# Patient Record
Sex: FEMALE | Race: WHITE | NOT HISPANIC OR LATINO | Employment: OTHER | URBAN - METROPOLITAN AREA
[De-identification: names, ages, dates, MRNs, and addresses within clinical notes are randomized per-mention and may not be internally consistent; named-entity substitution may affect disease eponyms.]

---

## 2017-01-10 ENCOUNTER — APPOINTMENT (OUTPATIENT)
Dept: RADIOLOGY | Facility: MEDICAL CENTER | Age: 72
End: 2017-01-10
Attending: INTERNAL MEDICINE
Payer: MEDICARE

## 2017-01-10 ENCOUNTER — RESOLUTE PROFESSIONAL BILLING HOSPITAL PROF FEE (OUTPATIENT)
Dept: CARDIOLOGY | Facility: MEDICAL CENTER | Age: 72
End: 2017-01-10
Payer: MEDICARE

## 2017-01-10 ENCOUNTER — APPOINTMENT (OUTPATIENT)
Dept: RADIOLOGY | Facility: MEDICAL CENTER | Age: 72
End: 2017-01-10
Attending: EMERGENCY MEDICINE
Payer: MEDICARE

## 2017-01-10 ENCOUNTER — HOSPITAL ENCOUNTER (OUTPATIENT)
Facility: MEDICAL CENTER | Age: 72
End: 2017-01-11
Attending: EMERGENCY MEDICINE | Admitting: INTERNAL MEDICINE
Payer: MEDICARE

## 2017-01-10 DIAGNOSIS — R94.31 ABNORMAL EKG: ICD-10-CM

## 2017-01-10 LAB
ALBUMIN SERPL BCP-MCNC: 3.8 G/DL (ref 3.2–4.9)
ALBUMIN/GLOB SERPL: 1.4 G/DL
ALP SERPL-CCNC: 67 U/L (ref 30–99)
ALT SERPL-CCNC: 13 U/L (ref 2–50)
ANION GAP SERPL CALC-SCNC: 11 MMOL/L (ref 0–11.9)
APTT PPP: 31.2 SEC (ref 24.7–36)
AST SERPL-CCNC: 24 U/L (ref 12–45)
BASOPHILS # BLD AUTO: 0.7 % (ref 0–1.8)
BASOPHILS # BLD: 0.04 K/UL (ref 0–0.12)
BILIRUB SERPL-MCNC: 0.2 MG/DL (ref 0.1–1.5)
BNP SERPL-MCNC: 95 PG/ML (ref 0–100)
BUN SERPL-MCNC: 8 MG/DL (ref 8–22)
CALCIUM SERPL-MCNC: 9 MG/DL (ref 8.5–10.5)
CHLORIDE SERPL-SCNC: 103 MMOL/L (ref 96–112)
CO2 SERPL-SCNC: 26 MMOL/L (ref 20–33)
CREAT SERPL-MCNC: 0.57 MG/DL (ref 0.5–1.4)
EKG IMPRESSION: NORMAL
EKG IMPRESSION: NORMAL
EOSINOPHIL # BLD AUTO: 0.14 K/UL (ref 0–0.51)
EOSINOPHIL NFR BLD: 2.3 % (ref 0–6.9)
ERYTHROCYTE [DISTWIDTH] IN BLOOD BY AUTOMATED COUNT: 47.9 FL (ref 35.9–50)
GFR SERPL CREATININE-BSD FRML MDRD: >60 ML/MIN/1.73 M 2
GLOBULIN SER CALC-MCNC: 2.8 G/DL (ref 1.9–3.5)
GLUCOSE SERPL-MCNC: 127 MG/DL (ref 65–99)
HCT VFR BLD AUTO: 39.4 % (ref 37–47)
HGB BLD-MCNC: 12.8 G/DL (ref 12–16)
IMM GRANULOCYTES # BLD AUTO: 0.03 K/UL (ref 0–0.11)
IMM GRANULOCYTES NFR BLD AUTO: 0.5 % (ref 0–0.9)
INR PPP: 0.96 (ref 0.87–1.13)
LV EJECT FRACT  99904: 65
LV EJECT FRACT MOD 2C 99903: 67.25
LV EJECT FRACT MOD 4C 99902: 66.88
LV EJECT FRACT MOD BP 99901: 67.95
LYMPHOCYTES # BLD AUTO: 0.79 K/UL (ref 1–4.8)
LYMPHOCYTES NFR BLD: 13 % (ref 22–41)
MAGNESIUM SERPL-MCNC: 1.3 MG/DL (ref 1.5–2.5)
MAGNESIUM SERPL-MCNC: 2 MG/DL (ref 1.5–2.5)
MCH RBC QN AUTO: 31.1 PG (ref 27–33)
MCHC RBC AUTO-ENTMCNC: 32.5 G/DL (ref 33.6–35)
MCV RBC AUTO: 95.6 FL (ref 81.4–97.8)
MONOCYTES # BLD AUTO: 0.38 K/UL (ref 0–0.85)
MONOCYTES NFR BLD AUTO: 6.3 % (ref 0–13.4)
NEUTROPHILS # BLD AUTO: 4.69 K/UL (ref 2–7.15)
NEUTROPHILS NFR BLD: 77.2 % (ref 44–72)
NRBC # BLD AUTO: 0 K/UL
NRBC BLD AUTO-RTO: 0 /100 WBC
PHOSPHATE SERPL-MCNC: 2.3 MG/DL (ref 2.5–4.5)
PHOSPHATE SERPL-MCNC: 3.5 MG/DL (ref 2.5–4.5)
PLATELET # BLD AUTO: 298 K/UL (ref 164–446)
PMV BLD AUTO: 9.7 FL (ref 9–12.9)
POTASSIUM SERPL-SCNC: 3.6 MMOL/L (ref 3.6–5.5)
PROT SERPL-MCNC: 6.6 G/DL (ref 6–8.2)
PROTHROMBIN TIME: 13.1 SEC (ref 12–14.6)
RBC # BLD AUTO: 4.12 M/UL (ref 4.2–5.4)
SODIUM SERPL-SCNC: 140 MMOL/L (ref 135–145)
TROPONIN I SERPL-MCNC: <0.01 NG/ML (ref 0–0.04)
WBC # BLD AUTO: 6.1 K/UL (ref 4.8–10.8)

## 2017-01-10 PROCEDURE — A9270 NON-COVERED ITEM OR SERVICE: HCPCS | Performed by: INTERNAL MEDICINE

## 2017-01-10 PROCEDURE — 93005 ELECTROCARDIOGRAM TRACING: CPT | Performed by: EMERGENCY MEDICINE

## 2017-01-10 PROCEDURE — 700102 HCHG RX REV CODE 250 W/ 637 OVERRIDE(OP): Performed by: INTERNAL MEDICINE

## 2017-01-10 PROCEDURE — 85730 THROMBOPLASTIN TIME PARTIAL: CPT

## 2017-01-10 PROCEDURE — 84100 ASSAY OF PHOSPHORUS: CPT

## 2017-01-10 PROCEDURE — 99205 OFFICE O/P NEW HI 60 MIN: CPT | Mod: 25 | Performed by: INTERNAL MEDICINE

## 2017-01-10 PROCEDURE — 96366 THER/PROPH/DIAG IV INF ADDON: CPT

## 2017-01-10 PROCEDURE — 93010 ELECTROCARDIOGRAM REPORT: CPT | Performed by: INTERNAL MEDICINE

## 2017-01-10 PROCEDURE — 93306 TTE W/DOPPLER COMPLETE: CPT

## 2017-01-10 PROCEDURE — 36415 COLL VENOUS BLD VENIPUNCTURE: CPT

## 2017-01-10 PROCEDURE — 80053 COMPREHEN METABOLIC PANEL: CPT

## 2017-01-10 PROCEDURE — 700101 HCHG RX REV CODE 250: Performed by: OPHTHALMOLOGY

## 2017-01-10 PROCEDURE — 96375 TX/PRO/DX INJ NEW DRUG ADDON: CPT

## 2017-01-10 PROCEDURE — 94760 N-INVAS EAR/PLS OXIMETRY 1: CPT

## 2017-01-10 PROCEDURE — 96361 HYDRATE IV INFUSION ADD-ON: CPT

## 2017-01-10 PROCEDURE — 93306 TTE W/DOPPLER COMPLETE: CPT | Mod: 26 | Performed by: INTERNAL MEDICINE

## 2017-01-10 PROCEDURE — 700105 HCHG RX REV CODE 258: Performed by: INTERNAL MEDICINE

## 2017-01-10 PROCEDURE — 85025 COMPLETE CBC W/AUTO DIFF WBC: CPT

## 2017-01-10 PROCEDURE — 83880 ASSAY OF NATRIURETIC PEPTIDE: CPT

## 2017-01-10 PROCEDURE — 93005 ELECTROCARDIOGRAM TRACING: CPT | Performed by: INTERNAL MEDICINE

## 2017-01-10 PROCEDURE — 70450 CT HEAD/BRAIN W/O DYE: CPT

## 2017-01-10 PROCEDURE — 96365 THER/PROPH/DIAG IV INF INIT: CPT

## 2017-01-10 PROCEDURE — G0378 HOSPITAL OBSERVATION PER HR: HCPCS

## 2017-01-10 PROCEDURE — 99220 PR INITIAL OBSERVATION CARE,LEVL III: CPT | Performed by: INTERNAL MEDICINE

## 2017-01-10 PROCEDURE — 99285 EMERGENCY DEPT VISIT HI MDM: CPT

## 2017-01-10 PROCEDURE — 700101 HCHG RX REV CODE 250: Performed by: INTERNAL MEDICINE

## 2017-01-10 PROCEDURE — 71010 DX-CHEST-PORTABLE (1 VIEW): CPT

## 2017-01-10 PROCEDURE — 700111 HCHG RX REV CODE 636 W/ 250 OVERRIDE (IP): Performed by: INTERNAL MEDICINE

## 2017-01-10 PROCEDURE — 85610 PROTHROMBIN TIME: CPT

## 2017-01-10 PROCEDURE — 84484 ASSAY OF TROPONIN QUANT: CPT

## 2017-01-10 PROCEDURE — 83735 ASSAY OF MAGNESIUM: CPT

## 2017-01-10 RX ORDER — LACTULOSE 20 G/30ML
30 SOLUTION ORAL
Status: DISCONTINUED | OUTPATIENT
Start: 2017-01-11 | End: 2017-01-11 | Stop reason: HOSPADM

## 2017-01-10 RX ORDER — ASPIRIN 325 MG
325 TABLET ORAL ONCE
Status: DISPENSED | OUTPATIENT
Start: 2017-01-10 | End: 2017-01-11

## 2017-01-10 RX ORDER — ONDANSETRON 4 MG/1
4 TABLET, ORALLY DISINTEGRATING ORAL EVERY 4 HOURS PRN
Status: DISCONTINUED | OUTPATIENT
Start: 2017-01-10 | End: 2017-01-10

## 2017-01-10 RX ORDER — ERYTHROMYCIN 5 MG/G
OINTMENT OPHTHALMIC ONCE
Status: DISCONTINUED | OUTPATIENT
Start: 2017-01-10 | End: 2017-01-11 | Stop reason: HOSPADM

## 2017-01-10 RX ORDER — MAGNESIUM SULFATE HEPTAHYDRATE 40 MG/ML
4 INJECTION, SOLUTION INTRAVENOUS ONCE
Status: COMPLETED | OUTPATIENT
Start: 2017-01-10 | End: 2017-01-11

## 2017-01-10 RX ORDER — M-VIT,TX,IRON,MINS/CALC/FOLIC 27MG-0.4MG
1 TABLET ORAL DAILY
COMMUNITY

## 2017-01-10 RX ORDER — ATORVASTATIN CALCIUM 80 MG/1
80 TABLET, FILM COATED ORAL
Status: DISCONTINUED | OUTPATIENT
Start: 2017-01-10 | End: 2017-01-10

## 2017-01-10 RX ORDER — SODIUM CHLORIDE 9 MG/ML
INJECTION, SOLUTION INTRAVENOUS CONTINUOUS
Status: DISCONTINUED | OUTPATIENT
Start: 2017-01-10 | End: 2017-01-11 | Stop reason: HOSPADM

## 2017-01-10 RX ORDER — AMOXICILLIN 250 MG
1 CAPSULE ORAL NIGHTLY
Status: DISCONTINUED | OUTPATIENT
Start: 2017-01-11 | End: 2017-01-11 | Stop reason: HOSPADM

## 2017-01-10 RX ORDER — BISACODYL 10 MG
10 SUPPOSITORY, RECTAL RECTAL
Status: DISCONTINUED | OUTPATIENT
Start: 2017-01-11 | End: 2017-01-11 | Stop reason: HOSPADM

## 2017-01-10 RX ORDER — VENLAFAXINE HYDROCHLORIDE 75 MG/1
150 CAPSULE, EXTENDED RELEASE ORAL DAILY
COMMUNITY

## 2017-01-10 RX ORDER — VENLAFAXINE HYDROCHLORIDE 75 MG/1
150 CAPSULE, EXTENDED RELEASE ORAL DAILY
Status: DISCONTINUED | OUTPATIENT
Start: 2017-01-11 | End: 2017-01-11 | Stop reason: HOSPADM

## 2017-01-10 RX ORDER — DOCUSATE SODIUM 100 MG/1
100 CAPSULE, LIQUID FILLED ORAL EVERY MORNING
Status: DISCONTINUED | OUTPATIENT
Start: 2017-01-11 | End: 2017-01-11 | Stop reason: HOSPADM

## 2017-01-10 RX ORDER — ENEMA 19; 7 G/133ML; G/133ML
1 ENEMA RECTAL
Status: DISCONTINUED | OUTPATIENT
Start: 2017-01-11 | End: 2017-01-11 | Stop reason: HOSPADM

## 2017-01-10 RX ORDER — MORPHINE SULFATE 4 MG/ML
2-4 INJECTION, SOLUTION INTRAMUSCULAR; INTRAVENOUS
Status: ACTIVE | OUTPATIENT
Start: 2017-01-10 | End: 2017-01-11

## 2017-01-10 RX ORDER — M-VIT,TX,IRON,MINS/CALC/FOLIC 27MG-0.4MG
1 TABLET ORAL DAILY
Status: DISCONTINUED | OUTPATIENT
Start: 2017-01-11 | End: 2017-01-11 | Stop reason: HOSPADM

## 2017-01-10 RX ORDER — LABETALOL HYDROCHLORIDE 5 MG/ML
10 INJECTION, SOLUTION INTRAVENOUS EVERY 4 HOURS PRN
Status: DISCONTINUED | OUTPATIENT
Start: 2017-01-10 | End: 2017-01-11 | Stop reason: HOSPADM

## 2017-01-10 RX ORDER — ROSUVASTATIN CALCIUM 20 MG/1
20 TABLET, COATED ORAL EVERY EVENING
Status: DISCONTINUED | OUTPATIENT
Start: 2017-01-10 | End: 2017-01-11 | Stop reason: HOSPADM

## 2017-01-10 RX ORDER — OLMESARTAN MEDOXOMIL 20 MG/1
40 TABLET ORAL
Status: DISCONTINUED | OUTPATIENT
Start: 2017-01-10 | End: 2017-01-11 | Stop reason: HOSPADM

## 2017-01-10 RX ORDER — VENLAFAXINE HYDROCHLORIDE 75 MG/1
75 CAPSULE, EXTENDED RELEASE ORAL DAILY
Status: DISCONTINUED | OUTPATIENT
Start: 2017-01-10 | End: 2017-01-10

## 2017-01-10 RX ORDER — ALBUTEROL SULFATE 90 UG/1
2 AEROSOL, METERED RESPIRATORY (INHALATION) EVERY 6 HOURS PRN
Status: DISCONTINUED | OUTPATIENT
Start: 2017-01-10 | End: 2017-01-11 | Stop reason: HOSPADM

## 2017-01-10 RX ORDER — VENLAFAXINE 75 MG/1
150 TABLET ORAL EVERY MORNING
Status: DISCONTINUED | OUTPATIENT
Start: 2017-01-11 | End: 2017-01-10

## 2017-01-10 RX ORDER — LANOLIN ALCOHOL/MO/W.PET/CERES
3 CREAM (GRAM) TOPICAL
COMMUNITY

## 2017-01-10 RX ORDER — ACETAMINOPHEN 325 MG/1
650 TABLET ORAL EVERY 6 HOURS PRN
Status: DISCONTINUED | OUTPATIENT
Start: 2017-01-10 | End: 2017-01-11 | Stop reason: HOSPADM

## 2017-01-10 RX ORDER — AZITHROMYCIN 250 MG/1
250-500 TABLET, FILM COATED ORAL DAILY
Status: ON HOLD | COMMUNITY
Start: 2017-01-06 | End: 2017-01-11

## 2017-01-10 RX ORDER — NITROGLYCERIN 0.4 MG/1
0.4 TABLET SUBLINGUAL
Status: DISCONTINUED | OUTPATIENT
Start: 2017-01-10 | End: 2017-01-11 | Stop reason: HOSPADM

## 2017-01-10 RX ORDER — POTASSIUM CHLORIDE 20 MEQ/1
40 TABLET, EXTENDED RELEASE ORAL EVERY 4 HOURS
Status: COMPLETED | OUTPATIENT
Start: 2017-01-10 | End: 2017-01-10

## 2017-01-10 RX ORDER — ROSUVASTATIN CALCIUM 20 MG/1
20 TABLET, COATED ORAL EVERY EVENING
COMMUNITY

## 2017-01-10 RX ORDER — ALBUTEROL SULFATE 90 UG/1
2 AEROSOL, METERED RESPIRATORY (INHALATION) EVERY 6 HOURS PRN
COMMUNITY

## 2017-01-10 RX ORDER — ERYTHROMYCIN 5 MG/G
OINTMENT OPHTHALMIC 4 TIMES DAILY
Status: DISCONTINUED | OUTPATIENT
Start: 2017-01-10 | End: 2017-01-11 | Stop reason: HOSPADM

## 2017-01-10 RX ORDER — AMOXICILLIN 250 MG
1 CAPSULE ORAL
Status: DISCONTINUED | OUTPATIENT
Start: 2017-01-11 | End: 2017-01-11 | Stop reason: HOSPADM

## 2017-01-10 RX ORDER — ONDANSETRON 2 MG/ML
4 INJECTION INTRAMUSCULAR; INTRAVENOUS EVERY 4 HOURS PRN
Status: DISCONTINUED | OUTPATIENT
Start: 2017-01-10 | End: 2017-01-10

## 2017-01-10 RX ADMIN — ACETAMINOPHEN 650 MG: 325 TABLET, FILM COATED ORAL at 15:00

## 2017-01-10 RX ADMIN — LABETALOL HYDROCHLORIDE 10 MG: 5 INJECTION, SOLUTION INTRAVENOUS at 17:06

## 2017-01-10 RX ADMIN — SODIUM CHLORIDE: 9 INJECTION, SOLUTION INTRAVENOUS at 14:16

## 2017-01-10 RX ADMIN — ERYTHROMYCIN: 5 OINTMENT OPHTHALMIC at 20:34

## 2017-01-10 RX ADMIN — POTASSIUM CHLORIDE 40 MEQ: 1500 TABLET, EXTENDED RELEASE ORAL at 14:16

## 2017-01-10 RX ADMIN — ROSUVASTATIN CALCIUM 20 MG: 20 TABLET ORAL at 20:34

## 2017-01-10 RX ADMIN — POTASSIUM CHLORIDE 40 MEQ: 1500 TABLET, EXTENDED RELEASE ORAL at 18:44

## 2017-01-10 RX ADMIN — MAGNESIUM SULFATE IN WATER 4 G: 40 INJECTION, SOLUTION INTRAVENOUS at 20:34

## 2017-01-10 RX ADMIN — METOPROLOL TARTRATE 25 MG: 25 TABLET, FILM COATED ORAL at 14:16

## 2017-01-10 RX ADMIN — ACETAMINOPHEN 650 MG: 325 TABLET, FILM COATED ORAL at 21:37

## 2017-01-10 ASSESSMENT — PAIN SCALES - GENERAL
PAINLEVEL_OUTOF10: 0
PAINLEVEL_OUTOF10: 2
PAINLEVEL_OUTOF10: 0

## 2017-01-10 ASSESSMENT — COPD QUESTIONNAIRES
HAVE YOU SMOKED AT LEAST 100 CIGARETTES IN YOUR ENTIRE LIFE: YES
DO YOU EVER COUGH UP ANY MUCUS OR PHLEGM?: NO/ONLY WITH OCCASIONAL COLDS OR INFECTIONS
DURING THE PAST 4 WEEKS HOW MUCH DID YOU FEEL SHORT OF BREATH: SOME OF THE TIME
COPD SCREENING SCORE: 6

## 2017-01-10 ASSESSMENT — LIFESTYLE VARIABLES
ON A TYPICAL DAY WHEN YOU DRINK ALCOHOL HOW MANY DRINKS DO YOU HAVE: 1
EVER_SMOKED: NEVER
EVER FELT BAD OR GUILTY ABOUT YOUR DRINKING: NO
EVER_SMOKED: YES
TOTAL SCORE: 0
HAVE PEOPLE ANNOYED YOU BY CRITICIZING YOUR DRINKING: NO
TOTAL SCORE: 0
HAVE YOU EVER FELT YOU SHOULD CUT DOWN ON YOUR DRINKING: NO
HOW MANY TIMES IN THE PAST YEAR HAVE YOU HAD 5 OR MORE DRINKS IN A DAY: 0
DO YOU DRINK ALCOHOL: YES
EVER HAD A DRINK FIRST THING IN THE MORNING TO STEADY YOUR NERVES TO GET RID OF A HANGOVER: NO
AVERAGE NUMBER OF DAYS PER WEEK YOU HAVE A DRINK CONTAINING ALCOHOL: 3
TOTAL SCORE: 0
CONSUMPTION TOTAL: NEGATIVE

## 2017-01-10 NOTE — ED NOTES
Med rec updated and and complete  Allergies reviewed  Pt had 3 RX bottles at bedside, went over RX bottles and returned RX bottles back to daughter.

## 2017-01-10 NOTE — ED PROVIDER NOTES
ED Provider Note    Scribed for Dashawn Landon M.D. by Dolores Ruiz. 1/10/2017  8:36 AM    Primary care provider: Jamie YANG M.D.  Means of arrival: Ambulance  History obtained from: Patient  History limited by: None    CHIEF COMPLAINT  Chief Complaint   Patient presents with   • Other     pt was brought in becuase of abnormal ekg at [facility], was at follow up for suture on left eye.        HPI  Gely Watson is a 71 y.o. female who presents to the Emergency Department due to an abnormal EKG reading at the [facility]. She states that she last had an EKG while at [facility] a year ago by her cardiologist, Dr. Souza. Patient reports that she just recently got over bronchitis and finished her antibiotics 5 days ago. Denies chest pain, shortness of breath.    REVIEW OF SYSTEMS  Pertinent positives include abnormal EKG reading. Pertinent negatives include no chest pain, SOB.  All other systems reviewed and negative.    PAST MEDICAL HISTORY   has a past medical history of Hypertension and Psychiatric disorder.    SURGICAL HISTORY   has past surgical history that includes other.    SOCIAL HISTORY  Social History   Substance Use Topics   • Smoking status: Former Smoker     Quit date: 12/05/1978   • Smokeless tobacco: None   • Alcohol Use: Yes      Comment: glass of wine      History   Drug Use No       FAMILY HISTORY  History reviewed. No pertinent family history.    CURRENT MEDICATIONS  Home Medications     Reviewed by Zay Bhatt (Pharmacy Tech) on 01/10/17 at 1213  Med List Status: Complete    Medication Last Dose Status    albuterol 108 (90 BASE) MCG/ACT Aero Soln inhalation aerosol 1/10/2017 Active    azithromycin (ZITHROMAX) 250 MG Tab 1/9/2017 Active    Coenzyme Q10 (CO Q 10 PO) > 2 weeks Active    Cyanocobalamin (B-12 PO) > 2 weeks Active    KRILL OIL PO > 2 weeks Active    melatonin 3 MG Tab 1/9/2017 Active    olmesartan (BENICAR) 40 MG TABS 1/9/2017  "Active    rosuvastatin (CRESTOR) 20 MG Tab 1/9/2017 Active    therapeutic multivitamin-minerals (THERAGRAN-M) Tab > 2 weeks Active    venlafaxine XR (EFFEXOR XR) 75 MG CAPSULE SR 24 HR 1/10/2017 Active                ALLERGIES  No Known Allergies    PHYSICAL EXAM  VITAL SIGNS: /83 mmHg  Pulse 60  Temp(Src) 35.9 °C (96.6 °F)  Resp 16  Ht 1.575 m (5' 2.01\")  Wt 54.885 kg (121 lb)  BMI 22.13 kg/m2    Constitutional: Well developed, Well nourished, no distress, Non-toxic appearance.   HENT: Normocephalic, Atraumatic, Bilateral external ears normal, Oropharynx moist, No oral exudates.   Eyes: Patch over left eye. PERRLA, EOMI, Conjunctiva normal, No discharge.   Neck: No tenderness, Supple, No stridor.   Lymphatic: No lymphadenopathy noted.   Cardiovascular: Normal heart rate, Normal rhythm.   Thorax & Lungs: Clear to auscultation bilaterally, No respiratory distress, No wheezing, Bilateral crackles over bases  Abdomen: Soft, No tenderness, No masses, No pulsatile masses.   Skin: Warm, Dry, No erythema, No rash.   Extremities:, No edema No cyanosis.   Musculoskeletal: No tenderness to palpation or major deformities noted.  Intact distal pulses  Neurologic: Awake, alert. Moves all extremities spontaneously.  Psychiatric: Slightly anxious. Affect normal, Judgment normal, Mood normal.      LABS  Labs Reviewed   CBC WITH DIFFERENTIAL - Abnormal; Notable for the following:     RBC 4.12 (*)     MCHC 32.5 (*)     Neutrophils-Polys 77.20 (*)     Lymphocytes 13.00 (*)     Lymphs (Absolute) 0.79 (*)     All other components within normal limits   COMP METABOLIC PANEL - Abnormal; Notable for the following:     Glucose 127 (*)     All other components within normal limits   MAGNESIUM - Abnormal; Notable for the following:     Magnesium 1.3 (*)     All other components within normal limits   PHOSPHORUS - Abnormal; Notable for the following:     Phosphorus 2.3 (*)     All other components within normal limits   TROPONIN "   BTYPE NATRIURETIC PEPTIDE   ESTIMATED GFR   TROPONIN   PROTHROMBIN TIME    Narrative:     Indicate which anticoagulants the patient is on:->NONE   APTT    Narrative:     Indicate which anticoagulants the patient is on:->NONE   MAGNESIUM   PHOSPHORUS   TROPONIN     All labs reviewed by me.    EKG  12 lead EKG interpreted by me to show normal sinus rhythm at a rate of 60 . Normal P waves, Normal QRS, Normal ST-T waves,  Normal axis. Normal intervals.Overall clinical impression: flipped T waves concerning for ischemia. No old EKG in the system however grossly unchanged from earlier today. However her reviewed from EKG in April 2014 shows new flipped T waves concerning for ischemia.    RADIOLOGY  CT-HEAD W/O   Final Result      Head CT without contrast within normal limits. No evidence of acute cerebral infarction, hemorrhage or mass lesion.      ECHOCARDIOGRAM COMP W/O CONT   Final Result      DX-CHEST-PORTABLE (1 VIEW)   Final Result      1.  Basilar densities which are most likely atelectasis      2.  Enlarged cardiac silhouette      NM-CARDIAC STRESS TEST    (Results Pending)     The radiologist's interpretation of all radiological studies have been reviewed by me.    COURSE & MEDICAL DECISION MAKING  Pertinent Labs & Imaging studies reviewed. (See chart for details)    9:01 AM - I reviewed the patient's medical records which showed a history of hypertension, chronic PVCs. Reportedly EKG showed questionable wide spread ischemia vs. STEMI. Was given Morphine verse-ed and triol . Was pre-opted to get a closer of her left eyelid. Had blood tests on the 6th with a WBC 11.6, sugar 157. EKG at outside facility shows diffuse flip T waves laterally and septally.  EKG here shows continue flip T waves. 1 AVL V2-V6. No ST elevation. Overall flip T wave concerning for ischemia. EKG from 2014 was fairly normal. A nuclear stress test was done in May 2014 that was normal.     9:01 AM - Patient seen and examined at bedside.  Ordered DX-chest, CBC with differential, CMP, troponin, BNP,, EKG to evaluate her symptoms.     9:40 AM - Discussed old EKG results with the patient which was relatively normal. I addressed any further questions and concerns and [facility]     11:07 AM - I reviewed the patient's above findings and am paging the hospitalist for admission.    11:07 AM - I discussed the patient's case and the above findings with the Hospitalist who agrees to consult. He is requesting to speak with the patients cardiologist, Dr. Harley lynch.       Decision Making:  Patient was grossly abnormal EKG no overt symptoms. Reviewed old records was able to get old records from [facility] that shows a fairly normal EKG in April 2014. Due to this I believe the patient should be admitted to the hospital for cardiac workup, differential diagnosis includes silent myocardial infarction, stress cardiomyopathy, discussed the case with the hospitalist for admission to hospital.    DISPOSITION:  Patient will be admitted to hospitalist in guarded condition.      FINAL IMPRESSION  1. Abnormal EKG          Dolores AKHTAR (Aditi), am scribing for, and in the presence of, Dashawn Landon M.D..    Electronically signed by: Dolores Ruiz (Aditi), 1/10/2017    Dashawn AKHTAR M.D. personally performed the services described in this documentation, as scribed by Dolores Ruiz in my presence, and it is both accurate and complete.    The note accurately reflects work and decisions made by me.  Dashawn Landon  1/10/2017  4:26 PM

## 2017-01-10 NOTE — IP AVS SNAPSHOT
" After Visit Summary                                                                                                                  Name:Gely Watson  Medical Record Number:[id number]  CSN: [id number]    YOB: 1945   Age: 71 y.o.  Sex: female  HT:1.575 m (5' 2\") WT: 54.7 kg (120 lb 9.5 oz)          Admit Date: 1/10/2017     Discharge Date:   Today's Date: 1/11/2017  Attending Doctor:  Sonny Contreras M.D.                  Allergies:  Review of patient's allergies indicates no known allergies.            Discharge Instructions       Discharge Instructions    Discharged to home by car with relative. Discharged via wheelchair, hospital escort: Yes.  Special equipment needed: Not Applicable    Be sure to schedule a follow-up appointment with your primary care doctor or any specialists as instructed.     Discharge Plan:   Diet Plan: Discussed  Activity Level: Discussed  Confirmed Follow up Appointment: Patient to Call and Schedule Appointment  Confirmed Symptoms Management: Discussed  Medication Reconciliation Updated: Yes  Influenza Vaccine Indication: Not indicated: Previously immunized this influenza season and > 8 years of age    I understand that a diet low in cholesterol, fat, and sodium is recommended for good health. Unless I have been given specific instructions below for another diet, I accept this instruction as my diet prescription.   Other diet: Heart healthy     Special Instructions: Follow up with cardiologist in 1 month.  Dr. Gonzalez office will contact you for follow up procedure, hold aspirin until you hear from Dr. Gonzalez office per [place]     · Is patient discharged on Warfarin / Coumadin?   No     · Is patient Post Blood Transfusion?  No    Depression / Suicide Risk    As you are discharged from this [facility] facility, it is important to learn how to keep safe from harming yourself.    Recognize the warning signs:  · Abrupt changes in personality, positive or negative- including increase in energy   "   · Giving away possessions  · Change in eating patterns- significant weight changes-  positive or negative  · Change in sleeping patterns- unable to sleep or sleeping all the time   · Unwillingness or inability to communicate  · Depression  · Unusual sadness, discouragement and loneliness  · Talk of wanting to die  · Neglect of personal appearance   · Rebelliousness- reckless behavior  · Withdrawal from people/activities they love  · Confusion- inability to concentrate     If you or a loved one observes any of these behaviors or has concerns about self-harm, here's what you can do:  · Talk about it- your feelings and reasons for harming yourself  · Remove any means that you might use to hurt yourself (examples: pills, rope, extension cords, firearm)  · Get professional help from the community (Mental Health, Substance Abuse, psychological counseling)  · Do not be alone:Call your Safe Contact- someone whom you trust who will be there for you.  · Call your local CRISIS HOTLINE 275-2280 or 905-702-5414  · Call your local Children's Mobile Crisis Response Team [place] (595) 290-9746 or wwwRotten Tomatoes  · Call the toll free National Suicide Prevention Hotlines   · National Suicide Prevention Lifeline 189-252-VWQS (7583)  · National Hope Line Network 800-SUICIDE (639-7414)        Follow-up Information     1. Follow up with Zaida Sierra MD,FACC In 1 month.    Specialty:  Cardiology    Why:  Cardiac Follow up.     Contact information    [address] [place] [address] [address]  [facility] [address] [phone]           Discharge Medication Instructions:    Below are the medications your physician expects you to take upon discharge:    Review all your home medications and newly ordered medications with your doctor and/or pharmacist. Follow medication instructions as directed by your doctor and/or pharmacist.    Please keep your medication list with you and share with your physician.               Medication List       CHANGE how you take these medications        Instructions    olmesartan 40 MG Tabs   What changed:  when to take this   Commonly known as:  BENICAR    Take 1 Tab by mouth every day.   Dose:  40 mg         CONTINUE taking these medications        Instructions    albuterol 108 (90 BASE) MCG/ACT Aers inhalation aerosol    Inhale 2 Puffs by mouth every 6 hours as needed for Shortness of Breath.   Dose:  2 Puff       B-12 PO    Take 1 Tab by mouth every day.   Dose:  1 Tab       CO Q 10 PO    Take 1 Cap by mouth every day.   Dose:  1 Cap       KRILL OIL PO    Take 1 Cap by mouth every day.   Dose:  1 Cap       melatonin 3 MG Tabs    Take 3 mg by mouth every bedtime. Indications: Trouble Sleeping   Dose:  3 mg       rosuvastatin 20 MG Tabs   Last time this was given:  20 mg on 1/10/2017  8:34 PM   Commonly known as:  CRESTOR    Take 20 mg by mouth every evening.   Dose:  20 mg       therapeutic multivitamin-minerals Tabs    Take 1 Tab by mouth every day.   Dose:  1 Tab       venlafaxine XR 75 MG Cp24   Last time this was given:  150 mg on 1/11/2017 10:12 AM   Commonly known as:  EFFEXOR XR    Take 150 mg by mouth every day.   Dose:  150 mg         STOP taking these medications     azithromycin 250 MG Tabs   Commonly known as:  ZITHROMAX               Instructions           Diet / Nutrition:    Follow any diet instructions given to you by your doctor or the dietician, including how much salt (sodium) you are allowed each day.    If you are overweight, talk to your doctor about a weight reduction plan.    Activity:    Remain physically active following your doctor's instructions about exercise and activity.    Rest often.     Any time you become even a little tired or short of breath, SIT DOWN and rest.    Worsening Symptoms:    Report any of the following signs and symptoms to the doctor's office immediately:    *Pain of jaw, arm, or neck  *Chest pain not relieved by medication                               *Dizziness or  loss of consciousness  *Difficulty breathing even when at rest   *More tired than usual                                       *Bleeding drainage or swelling of surgical site  *Swelling of feet, ankles, legs or stomach                 *Fever (>100ºF)  *Pink or blood tinged sputum  *Weight gain (3lbs/day or 5lbs /week)           *Shock from internal defibrillator (if applicable)  *Palpitations or irregular heartbeats                *Cool and/or numb extremities    Stroke Awareness    Common Risk Factors for Stroke include:    Age  Atrial Fibrillation  Carotid Artery Stenosis  Diabetes Mellitus  Excessive alcohol consumption  High blood pressure  Overweight   Physical inactivity  Smoking    Warning signs and symptoms of a stroke include:    *Sudden numbness or weakness of the face, arm or leg (especially on one side of the body).  *Sudden confusion, trouble speaking or understanding.  *Sudden trouble seeing in one or both eyes.  *Sudden trouble walking, dizziness, loss of balance or coordination.Sudden severe headache with no known cause.    It is very important to get treatment quickly when a stroke occurs. If you experience any of the above warning signs, call 911 immediately.                   Disclaimer         Quit Smoking / Tobacco Use:    I understand the use of any tobacco products increases my chance of suffering from future heart disease or stroke and could cause other illnesses which may shorten my life. Quitting the use of tobacco products is the single most important thing I can do to improve my health. For further information on smoking / tobacco cessation call a Toll Free Quit Line at 1-460.419.6381 (*[employer] [place]) or 1-355.569.1737 (American Lung Association) or you can access the web based program at www.lungusa.org.    [place] Tobacco Users Help Line:  (417) 469-2334       Toll Free: 1-155.949.5377  Quit Tobacco Program [facility] (478)608-1322    Crisis  Hotline:    National Crisis Hotline:  4-558-NVEECSU or 1-524.704.7834    [place] Crisis Hotline:    1-309.467.1120 or 342-934-7855    Discharge Survey:   Thank you for choosing [facility]. We hope we did everything we could to make your hospital stay a pleasant one. You may be receiving a phone survey and we would appreciate your time and participation in answering the questions. Your input is very valuable to us in our efforts to improve our service to our patients and their families.        My signature on this form indicates that:    1. I have reviewed and understand the above information.  2. My questions regarding this information have been answered to my satisfaction.  3. I have formulated a plan with my discharge nurse to obtain my prescribed medications for home.                  Disclaimer         __________________________________                     __________       ________                       Patient Signature                                                 Date                    Time

## 2017-01-10 NOTE — CONSULTS
Cardiology Consult Note:    AmparoNancyAlejandra Harrisang  Date & Time note created:    1/10/2017   2:45 PM     Referring MD:  Dr. Mann    Patient ID:   Name:             Gely Watson     YOB: 1945  Age:                 71 y.o.  female   MRN:               [id number]                                                             Chief Complaint:      Abn EKG    History of Present Illness:    70 y/o female with Left eye lid cancer removal and subsequent plastic surg approach today; Happened to have an EKG showed deep t-wave inversion in anterior and lateral leads; No cardiac hx nor cardiac sx's; No sig FHx; Her  passed away in the last 4-5 months after prolong illness.  Trop neg x 2    Review of Systems:      Constitutional: Denies fevers, Denies weight changes  Eyes: Denies changes in vision, Lt eye pain with surg/dressed  Ears/Nose/Throat/Mouth: Denies nasal congestion or sore throat   Cardiovascular: - chest pain, - palpitations   Respiratory: - shortness of breath , Denies cough  Gastrointestinal/Hepatic: Denies abdominal pain, nausea, vomiting, diarrhea, constipation or GI bleeding   Genitourinary: Denies dysuria or frequency  Musculoskeletal/Rheum: Denies  joint pain and swelling   Skin: Denies rash  Neurological: Denies headache, confusion, memory loss or focal weakness/parasthesias  Psychiatric: denies mood disorder   Endocrine: Karyna, DM  thyroid problems  Heme/Oncology/Lymph Nodes: Denies enlarged lymph nodes, denies brusing or known bleeding disorder  All other systems were reviewed and are negative (AMA/CMS criteria)                Past Medical History:   Past Medical History   Diagnosis Date   • Hypertension    • Psychiatric disorder      Active Hospital Problems    Diagnosis   • Abnormal EKG [R94.31]       Past Surgical History:  Past Surgical History   Procedure Laterality Date   • Other         Hospital Medications:    Current facility-administered medications:   •  aspirin (ASA) tablet 325 mg,  325 mg, Oral, Once, Becki Mann M.D., Stopped at 01/10/17 1300  •  atorvastatin (LIPITOR) tablet 80 mg, 80 mg, Oral, QHS, Becki Mann M.D.  •  metoprolol (LOPRESSOR) tablet 25 mg, 25 mg, Oral, TWICE DAILY, Becki Mann M.D., 25 mg at 01/10/17 1416  •  olmesartan (BENICAR) tablet 40 mg, 40 mg, Oral, QHS, Becki Mann M.D.  •  [START ON 1/11/2017] docusate sodium (COLACE) capsule 100 mg, 100 mg, Oral, QAM **AND** [START ON 1/11/2017] senna-docusate (PERICOLACE or SENOKOT S) 8.6-50 MG per tablet 1 Tab, 1 Tab, Oral, Nightly **AND** [START ON 1/11/2017] senna-docusate (PERICOLACE or SENOKOT S) 8.6-50 MG per tablet 1 Tab, 1 Tab, Oral, Q24HRS PRN **AND** [START ON 1/11/2017] lactulose 20 GM/30ML solution 30 mL, 30 mL, Oral, Q24HRS PRN **AND** [START ON 1/11/2017] bisacodyl (DULCOLAX) suppository 10 mg, 10 mg, Rectal, Q24HRS PRN **AND** [START ON 1/11/2017] fleet enema 133 mL, 1 Each, Rectal, Once PRN, Becki Mann M.D.  •  Respiratory Care per Protocol, , Nebulization, Continuous RT, Becki Mann M.D.  •  NS infusion, , Intravenous, Continuous, Becki Mann M.D., Last Rate: 100 mL/hr at 01/10/17 1416  •  [START ON 1/11/2017] enoxaparin (LOVENOX) inj 40 mg, 40 mg, Subcutaneous, DAILY, Becki Mann M.D.  •  acetaminophen (TYLENOL) tablet 650 mg, 650 mg, Oral, Q6HRS PRN, Becki Mann M.D.  •  nitroglycerin (NITROSTAT) tablet 0.4 mg, 0.4 mg, Sublingual, Q5 MIN PRN, Becki Mann M.D.  •  morphine (pf) 4 mg/ml injection 2-4 mg, 2-4 mg, Intravenous, Q5 MIN PRN, Becki Mann M.D.  •  labetalol (NORMODYNE,TRANDATE) injection 10 mg, 10 mg, Intravenous, Q4HRS PRN, Becki Mann M.D.  •  [START ON 1/11/2017] aspirin EC (ECOTRIN) tablet 325 mg, 325 mg, Oral, DAILY, Becki Mann M.D.  •  potassium chloride SA (K-DUR) tablet 40 mEq, 40 mEq, Oral, Q4HRS, Becki Mann M.D., 40 mEq at 01/10/17 1416  •  [START ON 1/11/2017] venlafaxine (EFFEXOR) tablet 150 mg, 150 mg, Oral, QAM, Becki Mann M.D.    Current Outpatient  Medications:  Prescriptions prior to admission   Medication Sig Dispense Refill Last Dose   • venlafaxine XR (EFFEXOR XR) 75 MG CAPSULE SR 24 HR Take 150 mg by mouth every day.   1/10/2017 at 0500   • rosuvastatin (CRESTOR) 20 MG Tab Take 20 mg by mouth every evening.   1/9/2017 at 2000   • therapeutic multivitamin-minerals (THERAGRAN-M) Tab Take 1 Tab by mouth every day.   > 2 weeks at AM   • Coenzyme Q10 (CO Q 10 PO) Take 1 Cap by mouth every day.   > 2 weeks at AM   • azithromycin (ZITHROMAX) 250 MG Tab Take 250-500 mg by mouth every day. Pt started on 1/6/2017 for 5 day course for bronchitis.   1/9/2017 at 1700   • albuterol 108 (90 BASE) MCG/ACT Aero Soln inhalation aerosol Inhale 2 Puffs by mouth every 6 hours as needed for Shortness of Breath.   1/10/2017 at 0400   • KRILL OIL PO Take 1 Cap by mouth every day.   > 2 weeks at AM   • Cyanocobalamin (B-12 PO) Take 1 Tab by mouth every day.   > 2 weeks at AM   • melatonin 3 MG Tab Take 3 mg by mouth every bedtime. Indications: Trouble Sleeping   1/9/2017 at 2000   • olmesartan (BENICAR) 40 MG TABS Take 1 Tab by mouth every day. (Patient taking differently: Take 40 mg by mouth every evening.) 90 Each 0 1/9/2017 at 2000       Medication Allergy:  No Known Allergies    Family History:  History reviewed. No pertinent family history.    Social History:  Social History     Social History   • Marital Status:      Spouse Name: N/A   • Number of Children: N/A   • Years of Education: N/A     Occupational History   • Not on file.     Social History Main Topics   • Smoking status: Former Smoker     Quit date: 12/05/1978   • Smokeless tobacco: Not on file   • Alcohol Use: Yes      Comment: glass of wine   • Drug Use: No   • Sexual Activity: Not on file     Other Topics Concern   • Not on file     Social History Narrative         Physical Exam:  Vitals  Weight/BMI: Body mass index is 22.05 kg/(m^2).  Blood pressure 146/91, pulse 62, temperature 36.8 °C (98.2 °F), resp.  "rate 20, height 1.575 m (5' 2\"), weight 54.7 kg (120 lb 9.5 oz), SpO2 92 %.  Filed Vitals:    01/10/17 1100 01/10/17 1130 01/10/17 1201 01/10/17 1240   BP:    146/91   Pulse: 62 72  62   Temp:    36.8 °C (98.2 °F)   Resp: 16 16 16 20   Height:    1.575 m (5' 2\")   Weight:    54.7 kg (120 lb 9.5 oz)   SpO2: 89% 93% 92% 92%     Oxygen Therapy:  Pulse Oximetry: 92 %, O2 (LPM): 0, O2 Delivery: None (Room Air)  General Appearance:   Well developed, Well nourished, No acute distress, Non-toxic appearance.   HENT:  Normocephalic, Atraumatic, Oropharynx moist mucous membranes, Dentition: , Nose normal.    Eyes:Lt eye dressed;   Rt  EOMI, Conjunctiva normal, No discharge.  Neck:  Normal range of motion, No cervical tenderness, Supple, No stridor, no JVD .  No thyromegaly.  No carotid bruit.  Cardiovascular:  Normal heart rate, Normal rhythm,  S1, S2, no S3,  S4; No gallops; No murmurs, No rubs, .   Extremitites with intact distal pulses, no cyanosis, clubbing or edema.  No heaves, thrills, HJR;  Peripheral pulses: carotid 2+, brachial 2+, radial 2+, ulnar 2+, femoral 2+, popliteal 2+, PT 2+, DP 2+;  Lungs:  Respiratory effort is normal. Normal breath sounds, breath sounds clear to auscultation bilaterally,  no rales, no rhonchi, no wheezing.   Abdomen: Bowel sounds normal, Soft, No tenderness, No guarding, No rebound, No masses, No hepatosplenomegaly.  Skin: Warm, Dry, No erythema, No rash, no induration or crepitus.  Neurologic: Alert & oriented x 3, Normal motor function, Normal sensory function, No focal deficits noted, cranial nerves II through XII are normal,  .  Psychiatric: Affect normal, Judgment normal, Mood normal but anxious.      MDM (Data Review):     Records reviewed and summarized in current documentation    Lab Data Review:  Recent Results (from the past 24 hour(s))   EKG (ER)    Collection Time: 01/10/17  8:20 AM   Result Value Ref Range    Report       [facility]    Test " Date:  2017-01-10  Pt Name:    DENNISE REICH                 Department: ER  MRN:        [id number]                      Room:        16  Gender:     F                            Technician: [facility]  :        1945                   Requested By:SUSANNA LEE  Order #:    [id number]                    Avis MD:    Measurements  Intervals                                Axis  Rate:       60                           P:          50  VA:         180                          QRS:        55  QRSD:       106                          T:          139  QT:         512  QTc:        512    Interpretive Statements  SINUS RHYTHM  BORDERLINE INTRAVENTRICULAR CONDUCTION DELAY  ABNORMAL T, PROBABLE ISCHEMIA, ANT-LAT LEADS  PROLONGED QT INTERVAL  No previous ECG available for comparison     CBC WITH DIFFERENTIAL    Collection Time: 01/10/17  8:57 AM   Result Value Ref Range    WBC 6.1 4.8 - 10.8 K/uL    RBC 4.12 (L) 4.20 - 5.40 M/uL    Hemoglobin 12.8 12.0 - 16.0 g/dL    Hematocrit 39.4 37.0 - 47.0 %    MCV 95.6 81.4 - 97.8 fL    MCH 31.1 27.0 - 33.0 pg    MCHC 32.5 (L) 33.6 - 35.0 g/dL    RDW 47.9 35.9 - 50.0 fL    Platelet Count 298 164 - 446 K/uL    MPV 9.7 9.0 - 12.9 fL    Neutrophils-Polys 77.20 (H) 44.00 - 72.00 %    Lymphocytes 13.00 (L) 22.00 - 41.00 %    Monocytes 6.30 0.00 - 13.40 %    Eosinophils 2.30 0.00 - 6.90 %    Basophils 0.70 0.00 - 1.80 %    Immature Granulocytes 0.50 0.00 - 0.90 %    Nucleated RBC 0.00 /100 WBC    Neutrophils (Absolute) 4.69 2.00 - 7.15 K/uL    Lymphs (Absolute) 0.79 (L) 1.00 - 4.80 K/uL    Monos (Absolute) 0.38 0.00 - 0.85 K/uL    Eos (Absolute) 0.14 0.00 - 0.51 K/uL    Baso (Absolute) 0.04 0.00 - 0.12 K/uL    Immature Granulocytes (abs) 0.03 0.00 - 0.11 K/uL    NRBC (Absolute) 0.00 K/uL   COMP METABOLIC PANEL    Collection Time: 01/10/17  8:57 AM   Result Value Ref Range    Sodium 140 135 - 145 mmol/L    Potassium 3.6 3.6 - 5.5 mmol/L    Chloride 103 96 - 112 mmol/L    Co2 26 20 - 33  mmol/L    Anion Gap 11.0 0.0 - 11.9    Glucose 127 (H) 65 - 99 mg/dL    Bun 8 8 - 22 mg/dL    Creatinine 0.57 0.50 - 1.40 mg/dL    Calcium 9.0 8.5 - 10.5 mg/dL    AST(SGOT) 24 12 - 45 U/L    ALT(SGPT) 13 2 - 50 U/L    Alkaline Phosphatase 67 30 - 99 U/L    Total Bilirubin 0.2 0.1 - 1.5 mg/dL    Albumin 3.8 3.2 - 4.9 g/dL    Total Protein 6.6 6.0 - 8.2 g/dL    Globulin 2.8 1.9 - 3.5 g/dL    A-G Ratio 1.4 g/dL   TROPONIN    Collection Time: 01/10/17  8:57 AM   Result Value Ref Range    Troponin I <0.01 0.00 - 0.04 ng/mL   BTYPE NATRIURETIC PEPTIDE    Collection Time: 01/10/17  8:57 AM   Result Value Ref Range    B Natriuretic Peptide 95 0 - 100 pg/mL   ESTIMATED GFR    Collection Time: 01/10/17  8:57 AM   Result Value Ref Range    GFR If African American >60 >60 mL/min/1.73 m 2    GFR If Non African American >60 >60 mL/min/1.73 m 2   PROTHROMBIN TIME    Collection Time: 01/10/17  8:57 AM   Result Value Ref Range    PT 13.1 12.0 - 14.6 sec    INR 0.96 0.87 - 1.13   APTT    Collection Time: 01/10/17  8:57 AM   Result Value Ref Range    APTT 31.2 24.7 - 36.0 sec   MAGNESIUM    Collection Time: 01/10/17  8:57 AM   Result Value Ref Range    Magnesium 2.0 1.5 - 2.5 mg/dL   PHOSPHORUS    Collection Time: 01/10/17  8:57 AM   Result Value Ref Range    Phosphorus 3.5 2.5 - 4.5 mg/dL   ECHOCARDIOGRAM COMP W/O CONT    Collection Time: 01/10/17 12:05 PM   Result Value Ref Range    Eject.Frac. MOD BP 67.95     Eject.Frac. MOD 4C 66.88     Eject.Frac. MOD 2C 67.25     Left Ventrical Ejection Fraction 65    Troponin - STAT Once    Collection Time: 01/10/17  1:06 PM   Result Value Ref Range    Troponin I <0.01 0.00 - 0.04 ng/mL   Magnesium    Collection Time: 01/10/17  1:06 PM   Result Value Ref Range    Magnesium 1.3 (L) 1.5 - 2.5 mg/dL   PHOSPHORUS    Collection Time: 01/10/17  1:06 PM   Result Value Ref Range    Phosphorus 2.3 (L) 2.5 - 4.5 mg/dL   EKG in 4 Hours    Collection Time: 01/10/17  1:12 PM   Result Value Ref Range     Report       [facility]    Test Date:  2017-01-10  Pt Name:    DENNISE REICH                 Department: ER  MRN:        [id number]                      Room:       T201  Gender:     F                            Technician: [id number]  :        1945                   Requested By:GRIS AGGARWAL  Order #:    [id number]                    Reading MD: Ike Arnold MD    Measurements  Intervals                                Axis  Rate:       61                           P:          50  SC:         180                          QRS:        39  QRSD:       100                          T:          144  QT:         504  QTc:        508    Interpretive Statements  SINUS RHYTHM  ABNORMAL T, PROBABLE ISCHEMIA, ANT-LAT LEADS  BORDERLINE PROLONGED QT INTERVAL  Compared to ECG 01/10/2017 08:20:39  No significant changes    Electronically Signed On 1- 13:37:32 PST by Ike Arnold MD         Imaging/Procedures Review:    Chest Xray:  Reviewed    EKG:   As in HPI. See above    MDM (Assessment and Plan):     Active Hospital Problems    Diagnosis   • Abnormal EKG [R94.31]     Will see if abn Echo; STAT Echo ordered to r/o Takotsubo  CT of head  Consider stress test and/or LHC    Will follow  Thx    ADDN: Echo: Normal left ventricular chamber size.  Left ventricular ejection fraction is visually estimated to be 65%.  Grade I diastolic dysfunction.  Normal regional wall motion.  Mild aortic insufficiency.  Ascending aorta is dilated with a diameter of  4.1 cm.

## 2017-01-10 NOTE — DISCHARGE PLANNING
Care Transition Team Assessment    Information Source  Orientation : Oriented x 4  Who is responsible for making decisions for patient? : Patient  Source of Information: Patient  Primary Caregiver for Others?: No  Why do you believe you were admitted?: abnormal ekg    Readmission Evaluation  Is this a readmission?: No    Elopement Risk  Legal Hold: No  Ambulatory or Self Mobile in Wheelchair: No-Not an Elopement Risk    Interdisciplinary Discharge Planning  Does Admitting Nurse Feel This Could be a Complex Discharge?: No  Primary Care Physician: Ben  Lives with - Patient's Self Care Capacity: Unable To Determine At This Time  Patient or legal guardian wants to designate a caregiver (see row info): No  Support Systems: Family Member(s)  Housing / Facility: [address]  Do You Take your Prescribed Medications Regularly: Yes  Able to Return to Previous ADL's: Yes  Mobility Issues: No  Prior Services: None  Patient Expects to be Discharged to::  (home)  Assistance Needed: No  Durable Medical Equipment: Not Applicable    Discharge Preparedness  [facility] resources needed?: None  What is your plan after discharge?: Uncertain - pending medical team collaboration  Do you have concerns about your hospital stay or care after discharge?: No  Difficulity with ADLs: None  Difficulity with IADLs: None  [facility]: [facility]  Prescription Coverage: Yes    Functional Assesment  Prior Functional Level: Ambulatory    Finances  Source of Income: shelter  Medical Insurance Coverage: Medicare    Vision / Hearing Impairment  Vision Impairment : Yes  Hearing Impairment : No    Values / Beliefs / Concerns  Values / Beliefs Concerns : No    Advance Directive  Advance Directive?: Living Will  Document Location: Not available (place on chart w/in 36 hrs)    Domestic Abuse  Have you ever been the victim of abuse or violence?: No  Physical Abuse or Sexual Abuse: No  Verbal Abuse or Emotional Abuse: No  Possible Abuse Reported to:: Not  Applicable    Psychological Assessment  Suspect Abuse: No  Suspect Domestic Violence: No  History of Substance Abuse: None  History of Psychiatric Problems: No  Non-compliant with Treatment: No  Newly Diagnosed Catastrophic Illness: No  Needs Assistance Dealing with Catastrophic Illness: No  Cancer Diagnosis per Medical Record:  (eye lid)  Facing Drastic Life Change: No  No Needs at this Time: No Needs at This Time    Discharge Risks or Barriers  Discharge risks or barriers?: No    Anticipated Discharge Information  Anticipated discharge disposition: Home  Discharge Address: face sheet  Discharge Contact Phone Number: she will call

## 2017-01-10 NOTE — ED NOTES
Pt brought in by EMS via gurney    Chief Complaint   Patient presents with   • Other     pt was brought in becuase of abnormal ekg at surgery center of [place], was at follow up for suture on left eye.      Pt had lower left eye lid cancer removed yesterday, was at follow up for suture.     Pt on monitor, in gown, chart up for ERP

## 2017-01-10 NOTE — PROGRESS NOTES
Dermatologist, Lindsey Rose, requesting for this RN to apply erythromycin ointment to L eye x1 for eye discomfort. Order in place.

## 2017-01-10 NOTE — PROGRESS NOTES
Pt arrived to unit via gurney from main ER. Assessment completed-see flowchart. Pt A&O. Pt denies CP/discomfort. L eye covered with gauze due to eye procedure. Pt oriented to unit routine and call light. Plan of care reviewed with patient, verbalized understating. Monitors applied. Will continue to monitor, call light within reach. Needs met. Family at bedside.

## 2017-01-11 ENCOUNTER — APPOINTMENT (OUTPATIENT)
Dept: RADIOLOGY | Facility: MEDICAL CENTER | Age: 72
End: 2017-01-11
Attending: INTERNAL MEDICINE
Payer: MEDICARE

## 2017-01-11 ENCOUNTER — PATIENT OUTREACH (OUTPATIENT)
Dept: HEALTH INFORMATION MANAGEMENT | Facility: OTHER | Age: 72
End: 2017-01-11

## 2017-01-11 VITALS
BODY MASS INDEX: 22.19 KG/M2 | TEMPERATURE: 97.6 F | WEIGHT: 120.59 LBS | OXYGEN SATURATION: 92 % | SYSTOLIC BLOOD PRESSURE: 147 MMHG | HEART RATE: 56 BPM | DIASTOLIC BLOOD PRESSURE: 77 MMHG | HEIGHT: 62 IN | RESPIRATION RATE: 16 BRPM

## 2017-01-11 PROBLEM — R94.31 T WAVE INVERSION IN ELECTROCARDIOGRAM: Status: ACTIVE | Noted: 2017-01-11

## 2017-01-11 PROBLEM — I71.21 ASCENDING AORTIC ANEURYSM (HCC): Status: ACTIVE | Noted: 2017-01-11

## 2017-01-11 LAB
ALBUMIN SERPL BCP-MCNC: 3.5 G/DL (ref 3.2–4.9)
ALBUMIN/GLOB SERPL: 1.1 G/DL
ALP SERPL-CCNC: 63 U/L (ref 30–99)
ALT SERPL-CCNC: 14 U/L (ref 2–50)
ANION GAP SERPL CALC-SCNC: 8 MMOL/L (ref 0–11.9)
AST SERPL-CCNC: 24 U/L (ref 12–45)
BILIRUB SERPL-MCNC: 0.2 MG/DL (ref 0.1–1.5)
BUN SERPL-MCNC: 7 MG/DL (ref 8–22)
CALCIUM SERPL-MCNC: 8.9 MG/DL (ref 8.5–10.5)
CHLORIDE SERPL-SCNC: 104 MMOL/L (ref 96–112)
CHOLEST SERPL-MCNC: 127 MG/DL (ref 100–199)
CO2 SERPL-SCNC: 24 MMOL/L (ref 20–33)
CREAT SERPL-MCNC: 0.55 MG/DL (ref 0.5–1.4)
ERYTHROCYTE [DISTWIDTH] IN BLOOD BY AUTOMATED COUNT: 47.8 FL (ref 35.9–50)
EST. AVERAGE GLUCOSE BLD GHB EST-MCNC: 117 MG/DL
GFR SERPL CREATININE-BSD FRML MDRD: >60 ML/MIN/1.73 M 2
GLOBULIN SER CALC-MCNC: 3.1 G/DL (ref 1.9–3.5)
GLUCOSE SERPL-MCNC: 144 MG/DL (ref 65–99)
HBA1C MFR BLD: 5.7 % (ref 0–5.6)
HCT VFR BLD AUTO: 38.6 % (ref 37–47)
HDLC SERPL-MCNC: 46 MG/DL
HGB BLD-MCNC: 12.6 G/DL (ref 12–16)
LDLC SERPL CALC-MCNC: 63 MG/DL
MCH RBC QN AUTO: 30.9 PG (ref 27–33)
MCHC RBC AUTO-ENTMCNC: 32.6 G/DL (ref 33.6–35)
MCV RBC AUTO: 94.6 FL (ref 81.4–97.8)
PLATELET # BLD AUTO: 321 K/UL (ref 164–446)
PMV BLD AUTO: 9.6 FL (ref 9–12.9)
POTASSIUM SERPL-SCNC: 4.4 MMOL/L (ref 3.6–5.5)
PROT SERPL-MCNC: 6.6 G/DL (ref 6–8.2)
RBC # BLD AUTO: 4.08 M/UL (ref 4.2–5.4)
SODIUM SERPL-SCNC: 136 MMOL/L (ref 135–145)
TRIGL SERPL-MCNC: 89 MG/DL (ref 0–149)
WBC # BLD AUTO: 6 K/UL (ref 4.8–10.8)

## 2017-01-11 PROCEDURE — G0378 HOSPITAL OBSERVATION PER HR: HCPCS

## 2017-01-11 PROCEDURE — 99217 PR OBSERVATION CARE DISCHARGE: CPT | Performed by: HOSPITALIST

## 2017-01-11 PROCEDURE — 83036 HEMOGLOBIN GLYCOSYLATED A1C: CPT

## 2017-01-11 PROCEDURE — 700105 HCHG RX REV CODE 258: Performed by: INTERNAL MEDICINE

## 2017-01-11 PROCEDURE — 99213 OFFICE O/P EST LOW 20 MIN: CPT | Mod: 25 | Performed by: INTERNAL MEDICINE

## 2017-01-11 PROCEDURE — 700101 HCHG RX REV CODE 250: Performed by: INTERNAL MEDICINE

## 2017-01-11 PROCEDURE — 700111 HCHG RX REV CODE 636 W/ 250 OVERRIDE (IP)

## 2017-01-11 PROCEDURE — 80061 LIPID PANEL: CPT

## 2017-01-11 PROCEDURE — 96361 HYDRATE IV INFUSION ADD-ON: CPT

## 2017-01-11 PROCEDURE — 85027 COMPLETE CBC AUTOMATED: CPT

## 2017-01-11 PROCEDURE — 700111 HCHG RX REV CODE 636 W/ 250 OVERRIDE (IP): Performed by: INTERNAL MEDICINE

## 2017-01-11 PROCEDURE — A9270 NON-COVERED ITEM OR SERVICE: HCPCS | Performed by: INTERNAL MEDICINE

## 2017-01-11 PROCEDURE — 96367 TX/PROPH/DG ADDL SEQ IV INF: CPT

## 2017-01-11 PROCEDURE — 80053 COMPREHEN METABOLIC PANEL: CPT

## 2017-01-11 PROCEDURE — 96366 THER/PROPH/DIAG IV INF ADDON: CPT

## 2017-01-11 PROCEDURE — 700102 HCHG RX REV CODE 250 W/ 637 OVERRIDE(OP): Performed by: INTERNAL MEDICINE

## 2017-01-11 PROCEDURE — A9502 TC99M TETROFOSMIN: HCPCS

## 2017-01-11 PROCEDURE — 36415 COLL VENOUS BLD VENIPUNCTURE: CPT

## 2017-01-11 RX ORDER — REGADENOSON 0.08 MG/ML
INJECTION, SOLUTION INTRAVENOUS
Status: COMPLETED
Start: 2017-01-11 | End: 2017-01-11

## 2017-01-11 RX ADMIN — METOPROLOL TARTRATE 25 MG: 25 TABLET, FILM COATED ORAL at 10:11

## 2017-01-11 RX ADMIN — POTASSIUM PHOSPHATE, MONOBASIC AND POTASSIUM PHOSPHATE, DIBASIC 20 MMOL: 224; 236 INJECTION, SOLUTION INTRAVENOUS at 00:43

## 2017-01-11 RX ADMIN — ERYTHROMYCIN: 5 OINTMENT OPHTHALMIC at 10:11

## 2017-01-11 RX ADMIN — SODIUM CHLORIDE: 9 INJECTION, SOLUTION INTRAVENOUS at 02:48

## 2017-01-11 RX ADMIN — REGADENOSON 0.4 MG: 0.08 INJECTION, SOLUTION INTRAVENOUS at 08:57

## 2017-01-11 RX ADMIN — VENLAFAXINE HYDROCHLORIDE 150 MG: 75 CAPSULE, EXTENDED RELEASE ORAL at 10:12

## 2017-01-11 ASSESSMENT — PAIN SCALES - GENERAL
PAINLEVEL_OUTOF10: 0

## 2017-01-11 NOTE — PROGRESS NOTES
Assessment per flow chart. Pt on tele monitor,SB noted, HR-54. Pt A&Ox4, denies pain, dizziness.  Left A/C IV site- c,d,i.  Pt's SpO2-94% on R/A.  Discussed POC with pt, pt verbalized understanding and agreement.  Pt instructed to call for assistance, pt's belonging's and call light within reach. Will continue to monitor.

## 2017-01-11 NOTE — PROGRESS NOTES
Pt dc'd home. IV and monitor removed; Pt left unit via wheelchair with RN. Personal belongings with pt when leaving unit. Pt given discharge instructions prior to leaving unit including prescription and when to visit with physician; verbalizes understanding. Copy of discharge instructions with pt and in the chart.

## 2017-01-11 NOTE — PROGRESS NOTES
RN contacted scheduling for follow up cardiology appt. No answer x4. Message left for  regarding cardiology follow up.  contacted. Per  will make follow up when message is received and contact pt at home with appointment details. Pt anxious to go and ride at hospital. Pt discharged.

## 2017-01-11 NOTE — DISCHARGE INSTRUCTIONS
Discharge Instructions    Discharged to home by car with relative. Discharged via wheelchair, hospital escort: Yes.  Special equipment needed: Not Applicable    Be sure to schedule a follow-up appointment with your primary care doctor or any specialists as instructed.     Discharge Plan:   Diet Plan: Discussed  Activity Level: Discussed  Confirmed Follow up Appointment: Patient to Call and Schedule Appointment  Confirmed Symptoms Management: Discussed  Medication Reconciliation Updated: Yes  Influenza Vaccine Indication: Not indicated: Previously immunized this influenza season and > 8 years of age    I understand that a diet low in cholesterol, fat, and sodium is recommended for good health. Unless I have been given specific instructions below for another diet, I accept this instruction as my diet prescription.   Other diet: Heart healthy     Special Instructions: Follow up with cardiologist in 1 month.  Dr. Gnozalez office will contact you for follow up procedure, hold aspirin until you hear from Dr. Gonzalez office per [place]     · Is patient discharged on Warfarin / Coumadin?   No     · Is patient Post Blood Transfusion?  No    Depression / Suicide Risk    As you are discharged from this [facility] facility, it is important to learn how to keep safe from harming yourself.    Recognize the warning signs:  · Abrupt changes in personality, positive or negative- including increase in energy   · Giving away possessions  · Change in eating patterns- significant weight changes-  positive or negative  · Change in sleeping patterns- unable to sleep or sleeping all the time   · Unwillingness or inability to communicate  · Depression  · Unusual sadness, discouragement and loneliness  · Talk of wanting to die  · Neglect of personal appearance   · Rebelliousness- reckless behavior  · Withdrawal from people/activities they love  · Confusion- inability to concentrate     If you or a loved one observes any of these behaviors or has  concerns about self-harm, here's what you can do:  · Talk about it- your feelings and reasons for harming yourself  · Remove any means that you might use to hurt yourself (examples: pills, rope, extension cords, firearm)  · Get professional help from the community (Mental Health, Substance Abuse, psychological counseling)  · Do not be alone:Call your Safe Contact- someone whom you trust who will be there for you.  · Call your local CRISIS HOTLINE 248-9252 or 578-615-0927  · Call your local Children's Mobile Crisis Response Team [place] (956) 875-4275 or www.[employer]  · Call the toll free National Suicide Prevention Hotlines   · National Suicide Prevention Lifeline 986-175-MDDV (3770)  · National Hope Line Network 800-SUICIDE (376-5264)

## 2017-01-11 NOTE — PROGRESS NOTES
Dr. Gonzalez office updated on clearance from cardiologist. Magnolia from Dr. Gonzalez office will contact Dr. Gonzalez and return call to RN for further follow up instruction. Pt updated on POC.

## 2017-01-11 NOTE — DISCHARGE SUMMARY
CHIEF COMPLAINT ON ADMISSION  Abnormal EKG.    HPI & HOSPITAL COURSE  This is a 71 year old female with a past medical history of hypertension, hyperlipidemia, and depression.  She is admitted after she was found to have an abnormal EKG prior to left surgery.  On admission, EKG showed T-wave inversions in lead 1, aVL, V2, V3, V4, V5, and V6.  Her initial blood work up was unremarkable and her troponin remained negative.  Echocardiogram showed a LVEF of 65% and also showed ascending aorta dilation.  Chest x-ray was unremarkable.  Cardiac stress test negative inducible ischemia or infarction.  The patient never complained of chest pain, shortness of breath, nausea, vomiting, or diaphoresis.  Her vital signs remained stable.  Dr. Sierra from cardiology assessed the patient and determined she is safe to discharge.  She will need to follow up her aorta dilation with imaging in 6-12 months.  As the patient is stable with no further need for acute treatment, she will be discharged home today.    DISCHARGE PROBLEM LIST  1.  Abnormal EKG  2.  Ascending aortic aneurysm    FOLLOW UP  The patient will follow up with her primary care physician in one week.  She will follow up with cardiology in 1-2 weeks.    MEDICATIONS ON DISCHARGE   Gely Watson   Home Medication Instructions ARCHIE:[id number]    Printed on:01/11/17 1021   Medication Information                      albuterol 108 (90 BASE) MCG/ACT Aero Soln inhalation aerosol  Inhale 2 Puffs by mouth every 6 hours as needed for Shortness of Breath.             azithromycin (ZITHROMAX) 250 MG Tab  Take 250-500 mg by mouth every day. Pt started on 1/6/2017 for 5 day course for bronchitis.             Coenzyme Q10 (CO Q 10 PO)  Take 1 Cap by mouth every day.             Cyanocobalamin (B-12 PO)  Take 1 Tab by mouth every day.             KRILL OIL PO  Take 1 Cap by mouth every day.             melatonin 3 MG Tab  Take 3 mg by mouth every bedtime. Indications: Trouble Sleeping              olmesartan (BENICAR) 40 MG TABS  Take 1 Tab by mouth every day.             rosuvastatin (CRESTOR) 20 MG Tab  Take 20 mg by mouth every evening.             therapeutic multivitamin-minerals (THERAGRAN-M) Tab  Take 1 Tab by mouth every day.             venlafaxine XR (EFFEXOR XR) 75 MG CAPSULE SR 24 HR  Take 150 mg by mouth every day.                 DIET  Orders Placed This Encounter   Procedures   • DIET ORDER     Standing Status: Standing      Number of Occurrences: 1      Standing Expiration Date:      Order Specific Question:  Diet:     Answer:  Regular [1]       ACTIVITY  As tolerated.    CONSULTATIONS  Cardiology.      LABORATORY  Lab Results   Component Value Date/Time    SODIUM 136 01/11/2017 03:55 AM    POTASSIUM 4.4 01/11/2017 03:55 AM    CHLORIDE 104 01/11/2017 03:55 AM    CO2 24 01/11/2017 03:55 AM    GLUCOSE 144* 01/11/2017 03:55 AM    BUN 7* 01/11/2017 03:55 AM    CREATININE 0.55 01/11/2017 03:55 AM        Lab Results   Component Value Date/Time    WBC 6.0 01/11/2017 03:55 AM    HEMOGLOBIN 12.6 01/11/2017 03:55 AM    HEMATOCRIT 38.6 01/11/2017 03:55 AM    PLATELET COUNT 321 01/11/2017 03:55 AM        Total time of the discharge process was 36 minutes.

## 2017-01-11 NOTE — PROGRESS NOTES
"Interval History:  No CP; Stress test today showed neg for ischemia    Physical Exam   Blood pressure 147/77, pulse 56, temperature 36.4 °C (97.6 °F), resp. rate 16, height 1.575 m (5' 2\"), weight 54.7 kg (120 lb 9.5 oz), SpO2 92 %, not currently breastfeeding.    Constitutional:  SHe appears well-developed.   HENT: Normocephalic and atraumatic. No scleral icterus. Left eye lid surg  Neck: No JVD present.   Cardiovascular: Normal rate. Exam reveals no gallop and no friction rub. No murmur heard.   Pulmonary/Chest: CTAB coarse   Abdominal: S/NT/ND BS+   Musculoskeletal: SHe exhibits no edema. Pulses present.  Skin: Skin is warm and dry.       Intake/Output Summary (Last 24 hours) at 01/11/17 1233  Last data filed at 01/10/17 1500   Gross per 24 hour   Intake    480 ml   Output    300 ml   Net    180 ml       LABS:  Lab Results   Component Value Date/Time    CHOLESTEROL, 01/11/2017 03:55 AM    LDL 63 01/11/2017 03:55 AM    HDL 46 01/11/2017 03:55 AM    TRIGLYCERIDES 89 01/11/2017 03:55 AM       Lab Results   Component Value Date/Time    WBC 6.0 01/11/2017 03:55 AM    RBC 4.08* 01/11/2017 03:55 AM    HEMOGLOBIN 12.6 01/11/2017 03:55 AM    HEMATOCRIT 38.6 01/11/2017 03:55 AM    MCV 94.6 01/11/2017 03:55 AM    NEUTROPHILS-POLYS 77.20* 01/10/2017 08:57 AM    LYMPHOCYTES 13.00* 01/10/2017 08:57 AM    MONOCYTES 6.30 01/10/2017 08:57 AM    EOSINOPHILS 2.30 01/10/2017 08:57 AM    BASOPHILS 0.70 01/10/2017 08:57 AM     Lab Results   Component Value Date/Time    SODIUM 136 01/11/2017 03:55 AM    POTASSIUM 4.4 01/11/2017 03:55 AM    CHLORIDE 104 01/11/2017 03:55 AM    CO2 24 01/11/2017 03:55 AM    GLUCOSE 144* 01/11/2017 03:55 AM    BUN 7* 01/11/2017 03:55 AM    CREATININE 0.55 01/11/2017 03:55 AM    BUN-CREATININE RATIO 28* 01/05/2012 08:46 AM         Lab Results   Component Value Date/Time    ALKALINE PHOSPHATASE 63 01/11/2017 03:55 AM    AST(SGOT) 24 01/11/2017 03:55 AM    ALT(SGPT) 14 01/11/2017 03:55 AM    TOTAL " BILIRUBIN 0.2 01/11/2017 03:55 AM      Lab Results   Component Value Date/Time    B NATRIURETIC PEPTIDE 95 01/10/2017 08:57 AM      No results found for: TSH  Lab Results   Component Value Date/Time    PT 13.1 01/10/2017 08:57 AM    INR 0.96 01/10/2017 08:57 AM        Medications reviewed    Imaging reviewed    ECHO(1/10/2017):Normal left ventricular chamber size.  Left ventricular ejection fraction is visually estimated to be 65%.  Grade I diastolic dysfunction.  Normal regional wall motion.  Mild aortic insufficiency.  Ascending aorta is dilated with a diameter of  4.1 cm.    NUCLEAR IMAGING INTERPRETATION   No reversible defects that would indicate ischemia.    Mild global hypokinesis  Stress Image LV EF:        47     %    Impressions:  Abn EKG  Negative ischemia  Ascending aortic aneurysm, 4.1 cm    Recommendations:  Follow as out patient  Case discussed with attending  Eye lid surg is low CV risk  Thx

## 2017-01-11 NOTE — PROGRESS NOTES
[place] from Dr. Gonzalez office contacted this RN. Per [place] Gonzalez office will contact pt for follow up and inform pt to hold all aspirin medication until pt hears from Dr. Gonzalez office.

## 2017-01-11 NOTE — PROGRESS NOTES
Pt resting calmly, with eyes closed, is stable with arousal. Denies pain or needs at this time. Call light and personal possessions within reach, will continue to monitor.

## 2017-01-11 NOTE — H&P
CHIEF COMPLAINT:  Abnormal EKG.    HISTORY OF PRESENTING ILLNESS:  This is a pleasant 71 years old female who   presents to [facility] emergency room after finding of   abnormal EKG prior to left eye surgery.  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  She is unable to tell me what   kind of cancer, but she reports that yesterday, she was seen by her   dermatologist, Dr. Lindsey Rose, who preformed excision and she was supposed   to follow up with ophthalmology today for suturing and final touch up for the   patient.  She was seen by Dr. Gonzalez from ophthalmology today and a preprocedure   EKG was obtained which reviewed T-wave inversion and subsequently patient was   sent over to [facility] emergency room for further   evaluation.  Patient reports that her cardiologist is Dr. Souza from [place] [facility] and reports that she had previously followed up with Dr. Souza and has had a stress test 18 months ago which was negative.  She   otherwise denies having any headache.  She denies any chest pain, shortness of   breath, or cough.  She reports that she has good functional capacity.  She is   able to go up and down the flight of stairs without stopping in between to   catch her breath or having chest pain during exertion.  She is able to walk   briskly without any issues.  She can walk greater than 2-3 blocks without   developing dyspnea on exertion or any chest pain.  She has had recent   bronchitis from which she has been recovering.  Otherwise denies any abdominal   pain, diarrhea, constipation, blood in bowel movements, or melena.  Denies   any dysuria, frequency, urgency, or hematuria.  Denies any lower extremity   swelling.  Denies any palpitations, orthopnea, paroxysmal nocturnal dyspnea.    HOME MEDICATIONS:  1.  Effexor  mg daily.  2.  Rosuvastatin 20 mg daily.  3.  Multivitamin.  4.  Coenzyme Q.  5.   Azithromycin.  6.  Albuterol inhaler as needed.  7.  Krill Oil.  8.  Cyanocobalamin.  9.  Melatonin.  10.  Olmesartan 40 mg daily.    PAST MEDICAL HISTORY:  1.  Hypertension.  2.  Hyperlipidemia.  3.  Depression.    SURGICAL HISTORY:  1.  Tonsillectomy.  2.  Laparoscopy.  3.  Eye surgery.    FAMILY HISTORY:  1.  Mother with history of scleroderma and coronary artery disease.  2.  Father with history of thymus cancer and coronary artery disease.  3.  Grandmother with history of congestive heart failure.    SOCIAL HISTORY:  Patient is an ex-smoker, who quit smoking in 1978.  Reports   drinking 2 alcoholic drinks daily but has not been drinking this recently and   anticipation of her surgery.  She is currently living by herself independent   with activities of daily living and IADLs.    ALLERGIES:  Patient has no known allergies.    REVIEW OF SYSTEMS:  Detailed review of systems was reviewed with the patient   and negative other than history of presenting illness and past medical   history.    PHYSICAL EXAMINATION:  VITAL SIGNS:  Temperature 35.9 degree Celsius, pulse of 72, respiratory rate   of 16, blood pressure of 172/91, weight of 64.7 kg, and oxygen saturation 93%   on room air.  GENERAL:  Alert and oriented x3, in no acute distress.  HEENT:  Head is normocephalic.  Right sided pupil is equal responsive to light   with intact extraocular movements on the right side.  The left sided eye is   covered with the dressing and hence exam of the left eye was not performed.    Otherwise, no nasal discharge or pharyngeal exudates.  NECK:  No palpable lymphadenopathy.  No jugular venous distention.  CARDIOVASCULAR SYSTEM:  Regular rate and rhythm.  S1 plus S2.  No murmurs,   rubs, or gallops.  RESPIRATION:  Clear to auscultation bilaterally.  No wheezing or rhonchi.  ABDOMEN:  Soft, nontender, nondistended.  Bowel sounds positive and normal in   frequency.  GENITOURINARY SYSTEM:  Not examined.  MUSCULOSKELETAL:  No joint  swelling or tenderness on examination.  SKIN:  No rashes, erythema or wounds.  NEUROLOGICAL:  Cranial nerves without any gross deficit.  Motor strength of   5/5 in bilateral upper and lower extremities.  No gross sensory deficits.  EXTREMITIES:  Pulses 2+ in bilateral lower extremities.  No edema, no   cyanosis.    LABORATORY STUDIES:  While blood cell count is 6.1, hemoglobin of 12.8,   platelet count of 298.  Sodium of 140, potassium of 3.6, BUN of 8, creatinine   of 0.57.  Liver function tests within normal limits.  Phosphorus is 2.3,   magnesium of 1.3.  Troponin I less than 0.01.  INR of 0.96.    IMAGING STUDIES:  1.  Chest x-ray obtained in presentation reveals basilar density, which are   most likely atelectasis and large cardiac silhouette.  2.  An echocardiogram obtained reveals normal left ventricular chamber size,   left ventricular ejection fraction to be 65%, grade I diastolic dysfunction,   normal regional wall motion, mild aortic insufficiency, ascending aorta   bilateral with diameter of 4.1 cm.  3.  Noncontrasted CT of the head does not review any acute cerebral infarction   hemorrhage or mass lesion.  4.  EKG, as personally reviewed by me and discussed with the emergency room   physician, Dr. Landon.  Patient has deep T-wave inversions in lead I, aVL,   and V2, V3, V4, V5, and V6.  Per Dr. Landon, the emergency room physician,   these are new when he compared them to prior EKGs from [place].    ASSESSMENT AND PLAN:  1.  Abnormal EKG with deep T-wave inversions in lead I and aVL which are new   in onset incidentally found preoperatively.  At this point in time, patient   will be admitted to the hospital for observation.  She does not have any   underlying cardiac symptoms.  Patient will be monitored on a telemetry unit   and her troponins will be cycled.  Recommend correction of underlying   electrolytes abnormalities with maintaining a potassium level greater than 4   and magnesium level  greater than 2.  Given her underlying hypomagnesemia,   concern if this may be related to this.  I obtained a STAT echocardiogram and   this is negative for any wall motions abnormalities.  A CT of the head was   obtained and this is negative for an acute intracranial pathology leading to   her EKG findings.  Patient is of Dr. Souza from [place] [facility].    Given this, I requested consultation from [place] [facility].  On call   cardiologist, Dr. Lopez return my call and discussed the case with me.  Per   Dr. Lopez, the patient has never followed up with [place] [facility] and   Dr. Souza has only read a previous stress test for this patient.  Dr. Lopez has recommended consultation from [facility] cardiology.  This was   subsequently discussed with the patient and patient is agreeable to   consultation from [facility] cardiology.  Subsequently, I discussed the case with   Dr. Sierra who has evaluated the patient and recommend obtaining a nuclear   stress test in the morning, which has been ordered and patient will be kept   n.p.o. overnight for proceeding with the stress test in the morning tomorrow.    Meanwhile, patient to be initiated on aspirin 325, beta blocker therapy, and   at home high intensity statin therapy will be continued.  A lipid profile and   hemoglobin A1c will be obtained for risk assessment.  2.  Status post left eyelid surgery.  Further recommendation per dermatology   and ophthalmology.  3.  Hypomagnesemia.  This has been replaced.  4.  Hypophosphatemia.  This has been replaced.  5.  Hypertension.  Continue baseline regimen.  6.  Hyperlipidemia.  Continue baseline regimen of high intensity statin   therapy.  7.  Depression.  Continue baseline regimen.  8.  Preventive prophylaxis.  Deep venous thrombosis preventive prophylaxis   with sequential compression device and subcutaneous enoxaparin has been   ordered.  Stress ulcer prophylaxis are not indicated.  9.  Code status.  Patient  is admitted to the hospital under full code status.       ____________________________________     MD CHERRIE PAYAN    DD:  01/10/2017 19:39:48  DT:  01/10/2017 22:31:46    D#:  [id number]  Job#:  [id number]

## 2017-01-11 NOTE — PROGRESS NOTES
Pt has been sleeping on and off, is stable with arousal. Denies pain or needs at this time. Call light and personal possessions within reach, will continue to monitor.

## 2017-01-11 NOTE — PROGRESS NOTES
OCULOPLASTICS PROGRESS NOTE    Paged Cardiology x 2 this afternoon to discuss the status of the patient without call back.  Chart notes and results from blood work and Echo reviewed.      Spoke with the patient and patient's RN via telephone. RN states that there is stress test pending tomorrow, but schedule is not known.     Patient is having irritation OS.      Recommendations:   1.  Ok to remove the bandage/dressing over the left eye.    2.  Dress left lower eyelid and left eye with Erythromycin ophthalmic ointment QID.   3.  Okay to use artificial tears to left eye prn.   4.  Will plan recon of LLL pending the completion or cardiac workup and clearance by cardiology.

## 2017-01-11 NOTE — PROGRESS NOTES
Bedside report received [id number]. POC discussed with pt; NPO status discussed; all questions answered at this time.

## 2021-01-15 DIAGNOSIS — Z23 NEED FOR VACCINATION: ICD-10-CM

## 2021-01-23 ENCOUNTER — IMMUNIZATION (OUTPATIENT)
Dept: FAMILY PLANNING/WOMEN'S HEALTH CLINIC | Facility: IMMUNIZATION CENTER | Age: 76
End: 2021-01-23
Attending: INTERNAL MEDICINE
Payer: MEDICARE

## 2021-01-23 DIAGNOSIS — Z23 ENCOUNTER FOR VACCINATION: Primary | ICD-10-CM

## 2021-01-23 DIAGNOSIS — Z23 NEED FOR VACCINATION: ICD-10-CM

## 2021-01-23 PROCEDURE — 91300 PFIZER SARS-COV-2 VACCINE: CPT | Performed by: INTERNAL MEDICINE

## 2021-01-23 PROCEDURE — 0001A PFIZER SARS-COV-2 VACCINE: CPT | Performed by: INTERNAL MEDICINE

## 2021-02-13 ENCOUNTER — IMMUNIZATION (OUTPATIENT)
Dept: FAMILY PLANNING/WOMEN'S HEALTH CLINIC | Facility: IMMUNIZATION CENTER | Age: 76
End: 2021-02-13
Attending: INTERNAL MEDICINE
Payer: MEDICARE

## 2021-02-13 DIAGNOSIS — Z23 ENCOUNTER FOR VACCINATION: Primary | ICD-10-CM

## 2021-02-13 PROCEDURE — 91300 PFIZER SARS-COV-2 VACCINE: CPT

## 2021-02-13 PROCEDURE — 0002A PFIZER SARS-COV-2 VACCINE: CPT

## 2024-07-10 ENCOUNTER — HOSPITAL ENCOUNTER (OUTPATIENT)
Facility: MEDICAL CENTER | Age: 79
End: 2024-07-10
Attending: OPHTHALMOLOGY | Admitting: OPHTHALMOLOGY
Payer: MEDICARE

## 2024-07-10 ENCOUNTER — ANESTHESIA (OUTPATIENT)
Dept: SURGERY | Facility: MEDICAL CENTER | Age: 79
End: 2024-07-10
Payer: MEDICARE

## 2024-07-10 ENCOUNTER — ANESTHESIA EVENT (OUTPATIENT)
Dept: SURGERY | Facility: MEDICAL CENTER | Age: 79
End: 2024-07-10
Payer: MEDICARE

## 2024-07-10 VITALS
RESPIRATION RATE: 18 BRPM | HEART RATE: 64 BPM | DIASTOLIC BLOOD PRESSURE: 60 MMHG | OXYGEN SATURATION: 94 % | HEIGHT: 62 IN | TEMPERATURE: 97 F | BODY MASS INDEX: 20.24 KG/M2 | WEIGHT: 110.01 LBS | SYSTOLIC BLOOD PRESSURE: 117 MMHG

## 2024-07-10 LAB
ANION GAP SERPL CALC-SCNC: 11 MMOL/L (ref 7–16)
BUN SERPL-MCNC: 9 MG/DL (ref 8–22)
CALCIUM SERPL-MCNC: 9.1 MG/DL (ref 8.5–10.5)
CHLORIDE SERPL-SCNC: 103 MMOL/L (ref 96–112)
CO2 SERPL-SCNC: 25 MMOL/L (ref 20–33)
CREAT SERPL-MCNC: 0.43 MG/DL (ref 0.5–1.4)
EKG IMPRESSION: NORMAL
GFR SERPLBLD CREATININE-BSD FMLA CKD-EPI: 99 ML/MIN/1.73 M 2
GLUCOSE SERPL-MCNC: 111 MG/DL (ref 65–99)
POTASSIUM SERPL-SCNC: 3.9 MMOL/L (ref 3.6–5.5)
SODIUM SERPL-SCNC: 139 MMOL/L (ref 135–145)

## 2024-07-10 PROCEDURE — 93005 ELECTROCARDIOGRAM TRACING: CPT | Performed by: OPHTHALMOLOGY

## 2024-07-10 PROCEDURE — 160009 HCHG ANES TIME/MIN: Performed by: OPHTHALMOLOGY

## 2024-07-10 PROCEDURE — 700105 HCHG RX REV CODE 258: Performed by: OPHTHALMOLOGY

## 2024-07-10 PROCEDURE — 80048 BASIC METABOLIC PNL TOTAL CA: CPT

## 2024-07-10 PROCEDURE — 160002 HCHG RECOVERY MINUTES (STAT): Performed by: OPHTHALMOLOGY

## 2024-07-10 PROCEDURE — 93010 ELECTROCARDIOGRAM REPORT: CPT | Performed by: INTERNAL MEDICINE

## 2024-07-10 PROCEDURE — 160046 HCHG PACU - 1ST 60 MINS PHASE II: Performed by: OPHTHALMOLOGY

## 2024-07-10 PROCEDURE — 700101 HCHG RX REV CODE 250: Performed by: ANESTHESIOLOGY

## 2024-07-10 PROCEDURE — 700101 HCHG RX REV CODE 250: Performed by: OPHTHALMOLOGY

## 2024-07-10 PROCEDURE — 700111 HCHG RX REV CODE 636 W/ 250 OVERRIDE (IP): Performed by: OPHTHALMOLOGY

## 2024-07-10 PROCEDURE — 160027 HCHG SURGERY MINUTES - 1ST 30 MINS LEVEL 2: Performed by: OPHTHALMOLOGY

## 2024-07-10 PROCEDURE — 160048 HCHG OR STATISTICAL LEVEL 1-5: Performed by: OPHTHALMOLOGY

## 2024-07-10 PROCEDURE — 160025 RECOVERY II MINUTES (STATS): Performed by: OPHTHALMOLOGY

## 2024-07-10 PROCEDURE — 700111 HCHG RX REV CODE 636 W/ 250 OVERRIDE (IP): Performed by: ANESTHESIOLOGY

## 2024-07-10 PROCEDURE — 160035 HCHG PACU - 1ST 60 MINS PHASE I: Performed by: OPHTHALMOLOGY

## 2024-07-10 RX ORDER — DIPHENHYDRAMINE HYDROCHLORIDE 50 MG/ML
12.5 INJECTION INTRAMUSCULAR; INTRAVENOUS
Status: DISCONTINUED | OUTPATIENT
Start: 2024-07-10 | End: 2024-07-10 | Stop reason: HOSPADM

## 2024-07-10 RX ORDER — HYDRALAZINE HYDROCHLORIDE 20 MG/ML
INJECTION INTRAMUSCULAR; INTRAVENOUS PRN
Status: DISCONTINUED | OUTPATIENT
Start: 2024-07-10 | End: 2024-07-10 | Stop reason: SURG

## 2024-07-10 RX ORDER — GLYCOPYRROLATE 0.2 MG/ML
INJECTION INTRAMUSCULAR; INTRAVENOUS PRN
Status: DISCONTINUED | OUTPATIENT
Start: 2024-07-10 | End: 2024-07-10 | Stop reason: SURG

## 2024-07-10 RX ORDER — HALOPERIDOL 5 MG/ML
1 INJECTION INTRAMUSCULAR
Status: DISCONTINUED | OUTPATIENT
Start: 2024-07-10 | End: 2024-07-10 | Stop reason: HOSPADM

## 2024-07-10 RX ORDER — DEXAMETHASONE SODIUM PHOSPHATE 4 MG/ML
INJECTION, SOLUTION INTRA-ARTICULAR; INTRALESIONAL; INTRAMUSCULAR; INTRAVENOUS; SOFT TISSUE
Status: DISCONTINUED | OUTPATIENT
Start: 2024-07-10 | End: 2024-07-10 | Stop reason: HOSPADM

## 2024-07-10 RX ORDER — HYDRALAZINE HYDROCHLORIDE 20 MG/ML
5 INJECTION INTRAMUSCULAR; INTRAVENOUS
Status: DISCONTINUED | OUTPATIENT
Start: 2024-07-10 | End: 2024-07-10 | Stop reason: HOSPADM

## 2024-07-10 RX ORDER — NEOMYCIN SULFATE, POLYMYXIN B SULFATE, AND DEXAMETHASONE 3.5; 10000; 1 MG/G; [USP'U]/G; MG/G
OINTMENT OPHTHALMIC
Status: DISCONTINUED | OUTPATIENT
Start: 2024-07-10 | End: 2024-07-10 | Stop reason: HOSPADM

## 2024-07-10 RX ORDER — TETRACAINE HYDROCHLORIDE 5 MG/ML
SOLUTION OPHTHALMIC
Status: DISCONTINUED | OUTPATIENT
Start: 2024-07-10 | End: 2024-07-10 | Stop reason: HOSPADM

## 2024-07-10 RX ORDER — OXYCODONE HCL 5 MG/5 ML
10 SOLUTION, ORAL ORAL
Status: DISCONTINUED | OUTPATIENT
Start: 2024-07-10 | End: 2024-07-10 | Stop reason: HOSPADM

## 2024-07-10 RX ORDER — BALANCED SALT SOLUTION 6.4; .75; .48; .3; 3.9; 1.7 MG/ML; MG/ML; MG/ML; MG/ML; MG/ML; MG/ML
SOLUTION OPHTHALMIC
Status: DISCONTINUED | OUTPATIENT
Start: 2024-07-10 | End: 2024-07-10 | Stop reason: HOSPADM

## 2024-07-10 RX ORDER — SODIUM CHLORIDE, SODIUM LACTATE, POTASSIUM CHLORIDE, CALCIUM CHLORIDE 600; 310; 30; 20 MG/100ML; MG/100ML; MG/100ML; MG/100ML
INJECTION, SOLUTION INTRAVENOUS CONTINUOUS
Status: DISCONTINUED | OUTPATIENT
Start: 2024-07-10 | End: 2024-07-10 | Stop reason: HOSPADM

## 2024-07-10 RX ORDER — LIDOCAINE HYDROCHLORIDE 20 MG/ML
INJECTION, SOLUTION EPIDURAL; INFILTRATION; INTRACAUDAL; PERINEURAL PRN
Status: DISCONTINUED | OUTPATIENT
Start: 2024-07-10 | End: 2024-07-10 | Stop reason: SURG

## 2024-07-10 RX ORDER — IPRATROPIUM BROMIDE AND ALBUTEROL SULFATE 2.5; .5 MG/3ML; MG/3ML
3 SOLUTION RESPIRATORY (INHALATION)
Status: DISCONTINUED | OUTPATIENT
Start: 2024-07-10 | End: 2024-07-10 | Stop reason: HOSPADM

## 2024-07-10 RX ORDER — METOPROLOL TARTRATE 1 MG/ML
1 INJECTION, SOLUTION INTRAVENOUS
Status: DISCONTINUED | OUTPATIENT
Start: 2024-07-10 | End: 2024-07-10 | Stop reason: HOSPADM

## 2024-07-10 RX ORDER — EPHEDRINE SULFATE 50 MG/ML
5 INJECTION, SOLUTION INTRAVENOUS
Status: DISCONTINUED | OUTPATIENT
Start: 2024-07-10 | End: 2024-07-10 | Stop reason: HOSPADM

## 2024-07-10 RX ORDER — ONDANSETRON 2 MG/ML
INJECTION INTRAMUSCULAR; INTRAVENOUS PRN
Status: DISCONTINUED | OUTPATIENT
Start: 2024-07-10 | End: 2024-07-10 | Stop reason: SURG

## 2024-07-10 RX ORDER — CEFAZOLIN SODIUM 1 G/3ML
INJECTION, POWDER, FOR SOLUTION INTRAMUSCULAR; INTRAVENOUS
Status: DISCONTINUED | OUTPATIENT
Start: 2024-07-10 | End: 2024-07-10 | Stop reason: HOSPADM

## 2024-07-10 RX ORDER — ONDANSETRON 2 MG/ML
4 INJECTION INTRAMUSCULAR; INTRAVENOUS
Status: DISCONTINUED | OUTPATIENT
Start: 2024-07-10 | End: 2024-07-10 | Stop reason: HOSPADM

## 2024-07-10 RX ORDER — OXYCODONE HCL 5 MG/5 ML
5 SOLUTION, ORAL ORAL
Status: DISCONTINUED | OUTPATIENT
Start: 2024-07-10 | End: 2024-07-10 | Stop reason: HOSPADM

## 2024-07-10 RX ADMIN — ONDANSETRON 4 MG: 2 INJECTION INTRAMUSCULAR; INTRAVENOUS at 11:09

## 2024-07-10 RX ADMIN — LIDOCAINE HYDROCHLORIDE 20 MG: 20 INJECTION, SOLUTION EPIDURAL; INFILTRATION; INTRACAUDAL at 10:49

## 2024-07-10 RX ADMIN — SODIUM CHLORIDE, POTASSIUM CHLORIDE, SODIUM LACTATE AND CALCIUM CHLORIDE: 600; 310; 30; 20 INJECTION, SOLUTION INTRAVENOUS at 10:20

## 2024-07-10 RX ADMIN — HYDRALAZINE HYDROCHLORIDE 10 MG: 20 INJECTION, SOLUTION INTRAMUSCULAR; INTRAVENOUS at 10:51

## 2024-07-10 RX ADMIN — PROPOFOL 50 MG: 10 INJECTION, EMULSION INTRAVENOUS at 10:49

## 2024-07-10 RX ADMIN — FENTANYL CITRATE 25 MCG: 50 INJECTION, SOLUTION INTRAMUSCULAR; INTRAVENOUS at 10:58

## 2024-07-10 RX ADMIN — GLYCOPYRROLATE 0.2 MG: 0.2 INJECTION INTRAMUSCULAR; INTRAVENOUS at 11:03

## 2024-07-10 RX ADMIN — PROPOFOL 20 MG: 10 INJECTION, EMULSION INTRAVENOUS at 11:05

## 2024-07-10 RX ADMIN — FENTANYL CITRATE 50 MCG: 50 INJECTION, SOLUTION INTRAMUSCULAR; INTRAVENOUS at 10:50

## 2024-07-10 ASSESSMENT — PAIN SCALES - GENERAL: PAIN_LEVEL: 0

## (undated) DEVICE — SET LEADWIRE 5 LEAD BEDSIDE DISPOSABLE ECG (1SET OF 5/EA)

## (undated) DEVICE — SODIUM CHL IRRIGATION 0.9% 1000ML (12EA/CA)

## (undated) DEVICE — CANISTER SUCTION 3000ML MECHANICAL FILTER AUTO SHUTOFF MEDI-VAC NONSTERILE LF DISP  (40EA/CA)

## (undated) DEVICE — PACK VITRECTOMY 23G 10K BEVELED (1EA/BX)

## (undated) DEVICE — CANISTER SUCTION RIGID RED 1500CC (40EA/CA)

## (undated) DEVICE — MASK OXYGEN VNYL ADLT MED CONC WITH 7 FOOT TUBING  - (50EA/CA)

## (undated) DEVICE — ELECTRODE DUAL RETURN W/ CORD - (50/PK)

## (undated) DEVICE — PAD EYE GAUZE COVERED OVAL 1 5/8 X 2 5/8" STERILE"

## (undated) DEVICE — CANNULA INJECTION 27G (EYE) - 10/BX ALCON

## (undated) DEVICE — PACK VITRECTOMY (1EA/CA)

## (undated) DEVICE — LACTATED RINGERS INJ 1000 ML - (14EA/CA 60CA/PF)

## (undated) DEVICE — CANNULA O2 COMFORT SOFT EAR ADULT 7 FT TUBING (50/CA)

## (undated) DEVICE — SLEEVE VASO CALF MED - (10PR/CA)

## (undated) DEVICE — SUCTION INSTRUMENT YANKAUER BULBOUS TIP W/O VENT (50EA/CA)

## (undated) DEVICE — GLOVE SZ 6 BIOGEL PI MICRO - PF LF (50PR/BX 4BX/CA)

## (undated) DEVICE — GLOVE SZ 7.5 BIOGEL PI MICRO - PF LF (50PR/BX)

## (undated) DEVICE — TUBE CONNECTING SUCTION - CLEAR PLASTIC STERILE 72 IN (50EA/CA)

## (undated) DEVICE — GLOVE BIOGEL SZ 7.5 SURGICAL PF LTX - (50PR/BX 4BX/CA)

## (undated) DEVICE — SUTURE GENERAL

## (undated) DEVICE — TOWEL STOP TIMEOUT SAFETY FLAG (40EA/CA)

## (undated) DEVICE — CANNULA DIVIDED ADULT CO2 - SAMPLE W/FEMALE CONNCT (25/CA)

## (undated) DEVICE — SHIELD OPTH AL GRTR CVR FOX (50EA/BX)

## (undated) DEVICE — NEEDLE FILTER ASPIRATION 18 GA X 1 1/2 IN (100EA/BX)

## (undated) DEVICE — GOWN WARMING STANDARD FLEX - (30/CA)

## (undated) DEVICE — WATER IRRIGATION STERILE 1000ML (12EA/CA)

## (undated) DEVICE — TUBING CLEARLINK DUO-VENT - C-FLO (48EA/CA)

## (undated) DEVICE — SENSOR OXIMETER ADULT SPO2 RD SET (20EA/BX)

## (undated) DEVICE — KIT  I.V. START (100EA/CA)